# Patient Record
Sex: MALE | Race: WHITE | NOT HISPANIC OR LATINO | Employment: FULL TIME | URBAN - METROPOLITAN AREA
[De-identification: names, ages, dates, MRNs, and addresses within clinical notes are randomized per-mention and may not be internally consistent; named-entity substitution may affect disease eponyms.]

---

## 2019-08-30 ENCOUNTER — OFFICE VISIT (OUTPATIENT)
Dept: FAMILY MEDICINE CLINIC | Facility: CLINIC | Age: 29
End: 2019-08-30

## 2019-08-30 ENCOUNTER — LAB (OUTPATIENT)
Dept: LAB | Facility: HOSPITAL | Age: 29
End: 2019-08-30

## 2019-08-30 VITALS
HEART RATE: 79 BPM | BODY MASS INDEX: 25.15 KG/M2 | SYSTOLIC BLOOD PRESSURE: 122 MMHG | DIASTOLIC BLOOD PRESSURE: 80 MMHG | TEMPERATURE: 98.4 F | OXYGEN SATURATION: 100 % | HEIGHT: 71 IN | WEIGHT: 179.6 LBS

## 2019-08-30 DIAGNOSIS — R10.11 RIGHT UPPER QUADRANT ABDOMINAL PAIN: Primary | ICD-10-CM

## 2019-08-30 DIAGNOSIS — R10.11 RIGHT UPPER QUADRANT ABDOMINAL PAIN: ICD-10-CM

## 2019-08-30 DIAGNOSIS — R10.9 RIGHT FLANK PAIN: ICD-10-CM

## 2019-08-30 LAB
ALBUMIN SERPL-MCNC: 4.7 G/DL (ref 3.5–5.2)
ALBUMIN/GLOB SERPL: 1.7 G/DL
ALP SERPL-CCNC: 73 U/L (ref 39–117)
ALT SERPL W P-5'-P-CCNC: 18 U/L (ref 1–41)
ANION GAP SERPL CALCULATED.3IONS-SCNC: 10.4 MMOL/L (ref 5–15)
AST SERPL-CCNC: 20 U/L (ref 1–40)
BASOPHILS # BLD AUTO: 0.03 10*3/MM3 (ref 0–0.2)
BASOPHILS NFR BLD AUTO: 0.4 % (ref 0–1.5)
BILIRUB SERPL-MCNC: 0.4 MG/DL (ref 0.2–1.2)
BILIRUB UR QL STRIP: NEGATIVE
BUN BLD-MCNC: 10 MG/DL (ref 6–20)
BUN/CREAT SERPL: 11.9 (ref 7–25)
CALCIUM SPEC-SCNC: 10.4 MG/DL (ref 8.6–10.5)
CHLORIDE SERPL-SCNC: 98 MMOL/L (ref 98–107)
CLARITY UR: CLEAR
CO2 SERPL-SCNC: 28.6 MMOL/L (ref 22–29)
COLOR UR: YELLOW
CREAT BLD-MCNC: 0.84 MG/DL (ref 0.76–1.27)
DEPRECATED RDW RBC AUTO: 41.1 FL (ref 37–54)
EOSINOPHIL # BLD AUTO: 0.18 10*3/MM3 (ref 0–0.4)
EOSINOPHIL NFR BLD AUTO: 2.6 % (ref 0.3–6.2)
ERYTHROCYTE [DISTWIDTH] IN BLOOD BY AUTOMATED COUNT: 12.2 % (ref 12.3–15.4)
GFR SERPL CREATININE-BSD FRML MDRD: 108 ML/MIN/1.73
GLOBULIN UR ELPH-MCNC: 2.8 GM/DL
GLUCOSE BLD-MCNC: 66 MG/DL (ref 65–99)
GLUCOSE UR STRIP-MCNC: NEGATIVE MG/DL
HCT VFR BLD AUTO: 50.7 % (ref 37.5–51)
HGB BLD-MCNC: 16.2 G/DL (ref 13–17.7)
HGB UR QL STRIP.AUTO: NEGATIVE
IMM GRANULOCYTES # BLD AUTO: 0.04 10*3/MM3 (ref 0–0.05)
IMM GRANULOCYTES NFR BLD AUTO: 0.6 % (ref 0–0.5)
KETONES UR QL STRIP: NEGATIVE
LEUKOCYTE ESTERASE UR QL STRIP.AUTO: NEGATIVE
LYMPHOCYTES # BLD AUTO: 1.36 10*3/MM3 (ref 0.7–3.1)
LYMPHOCYTES NFR BLD AUTO: 19.8 % (ref 19.6–45.3)
MCH RBC QN AUTO: 29.2 PG (ref 26.6–33)
MCHC RBC AUTO-ENTMCNC: 32 G/DL (ref 31.5–35.7)
MCV RBC AUTO: 91.5 FL (ref 79–97)
MONOCYTES # BLD AUTO: 0.53 10*3/MM3 (ref 0.1–0.9)
MONOCYTES NFR BLD AUTO: 7.7 % (ref 5–12)
NEUTROPHILS # BLD AUTO: 4.72 10*3/MM3 (ref 1.7–7)
NEUTROPHILS NFR BLD AUTO: 68.9 % (ref 42.7–76)
NITRITE UR QL STRIP: NEGATIVE
NRBC BLD AUTO-RTO: 0 /100 WBC (ref 0–0.2)
PH UR STRIP.AUTO: 7 [PH] (ref 5–8)
PLATELET # BLD AUTO: 262 10*3/MM3 (ref 140–450)
PMV BLD AUTO: 11.7 FL (ref 6–12)
POTASSIUM BLD-SCNC: 4.8 MMOL/L (ref 3.5–5.2)
PROT SERPL-MCNC: 7.5 G/DL (ref 6–8.5)
PROT UR QL STRIP: NEGATIVE
RBC # BLD AUTO: 5.54 10*6/MM3 (ref 4.14–5.8)
SODIUM BLD-SCNC: 137 MMOL/L (ref 136–145)
SP GR UR STRIP: 1.01 (ref 1–1.03)
UROBILINOGEN UR QL STRIP: NORMAL
WBC NRBC COR # BLD: 6.86 10*3/MM3 (ref 3.4–10.8)

## 2019-08-30 PROCEDURE — 85025 COMPLETE CBC W/AUTO DIFF WBC: CPT

## 2019-08-30 PROCEDURE — 99203 OFFICE O/P NEW LOW 30 MIN: CPT | Performed by: PHYSICIAN ASSISTANT

## 2019-08-30 PROCEDURE — 36415 COLL VENOUS BLD VENIPUNCTURE: CPT

## 2019-08-30 PROCEDURE — 80053 COMPREHEN METABOLIC PANEL: CPT

## 2019-08-30 PROCEDURE — 81003 URINALYSIS AUTO W/O SCOPE: CPT | Performed by: PHYSICIAN ASSISTANT

## 2019-08-30 NOTE — PROGRESS NOTES
Subjective   Chaparro Villegas is a 28 y.o. male  Establish Care (New Patient establish Care) and Abdominal Pain (Upper Right Quadrant Pain and right rib pain x4 months )      History of Present Illness  Patient comes in today as a new patient to establish care in our practice.  He has been experiencing right upper quadrant abdominal pain and right flank pain for the past 4 months daily.  No particular pattern with eating.  No acid reflux, no nausea or vomiting, no breathing difficulties, bowels are normal for him he has been experiencing loose bowels for a number of years no change in pattern.  He experienced bright red rectal bleeding twice with bowel movements but typically no blood.  The following portions of the patient's history were reviewed and updated as appropriate: allergies, current medications, past social history and problem list    Review of Systems   Constitutional: Negative for appetite change, chills, fever and unexpected weight change.   Respiratory: Negative for cough, chest tightness and shortness of breath.    Cardiovascular: Negative for chest pain.   Gastrointestinal: Positive for abdominal pain and blood in stool. Negative for abdominal distention, constipation, diarrhea, nausea and vomiting.   Genitourinary: Negative.  Negative for difficulty urinating and dysuria.   Musculoskeletal: Negative for back pain.   Skin: Negative for color change and rash.   Allergic/Immunologic: Negative for food allergies.   Hematological: Bruises/bleeds easily.       Objective     Vitals:    08/30/19 0940   BP: 122/80   Pulse: 79   Temp: 98.4 °F (36.9 °C)   SpO2: 100%       Physical Exam   Constitutional: He is oriented to person, place, and time. He appears well-developed and well-nourished. No distress.   Eyes: Conjunctivae are normal.   Cardiovascular: Normal rate and regular rhythm.   Pulmonary/Chest: Effort normal and breath sounds normal.   Abdominal: Soft. Bowel sounds are normal. He exhibits no distension  and no mass. There is tenderness ( Mild to moderate tenderness right upper quadrant and right flank). There is no rebound and no guarding. No hernia.   Neurological: He is alert and oriented to person, place, and time.   Skin: Skin is warm and dry. No rash noted. He is not diaphoretic. No erythema. No pallor.   Psychiatric: He has a normal mood and affect. His behavior is normal. Judgment and thought content normal.   Nursing note and vitals reviewed.      Assessment/Plan     Diagnoses and all orders for this visit:    Right upper quadrant abdominal pain  -     CBC & Differential; Future  -     Comprehensive metabolic panel; Future  -     US Liver; Future  -     US Gallbladder; Future  -     US Renal Limited; Future    Right flank pain  -     US Renal Limited; Future    Other orders  -     CBD oil (cannabidiol) capsule; Take 60 capsules by mouth Every Morning. For anxiety    I will contact patient with lab and ultrasound results when I receive them and review them and determine further treatment plan at that time.

## 2019-09-10 ENCOUNTER — APPOINTMENT (OUTPATIENT)
Dept: ULTRASOUND IMAGING | Facility: HOSPITAL | Age: 29
End: 2019-09-10

## 2019-09-10 ENCOUNTER — HOSPITAL ENCOUNTER (OUTPATIENT)
Dept: ULTRASOUND IMAGING | Facility: HOSPITAL | Age: 29
Discharge: HOME | End: 2019-09-10
Admitting: PHYSICIAN ASSISTANT

## 2019-09-10 DIAGNOSIS — R10.11 RIGHT UPPER QUADRANT ABDOMINAL PAIN: ICD-10-CM

## 2019-09-10 PROCEDURE — 76705 ECHO EXAM OF ABDOMEN: CPT

## 2019-09-11 ENCOUNTER — APPOINTMENT (OUTPATIENT)
Dept: ULTRASOUND IMAGING | Facility: HOSPITAL | Age: 29
End: 2019-09-11

## 2019-09-23 ENCOUNTER — TELEPHONE (OUTPATIENT)
Dept: FAMILY MEDICINE CLINIC | Facility: CLINIC | Age: 29
End: 2019-09-23

## 2019-09-24 ENCOUNTER — TRANSCRIBE ORDERS (OUTPATIENT)
Dept: FAMILY MEDICINE CLINIC | Facility: CLINIC | Age: 29
End: 2019-09-24

## 2019-09-24 DIAGNOSIS — R16.0 LIVER MASS: Primary | ICD-10-CM

## 2019-10-09 ENCOUNTER — TELEPHONE (OUTPATIENT)
Dept: FAMILY MEDICINE CLINIC | Facility: CLINIC | Age: 29
End: 2019-10-09

## 2019-10-15 ENCOUNTER — TRANSCRIBE ORDERS (OUTPATIENT)
Dept: FAMILY MEDICINE CLINIC | Facility: CLINIC | Age: 29
End: 2019-10-15

## 2019-10-15 ENCOUNTER — TELEPHONE (OUTPATIENT)
Dept: FAMILY MEDICINE CLINIC | Facility: CLINIC | Age: 29
End: 2019-10-15

## 2019-10-15 DIAGNOSIS — R10.31 RIGHT LOWER QUADRANT ABDOMINAL PAIN: Primary | ICD-10-CM

## 2019-10-15 NOTE — TELEPHONE ENCOUNTER
Sandy, when I look at this it shows that I signed it on the date of 26 September.  He will not let me sign it now.  I do not know why it is not going through.  It seems like I got this message several times and I have signed it.

## 2019-10-23 ENCOUNTER — HOSPITAL ENCOUNTER (OUTPATIENT)
Dept: CT IMAGING | Facility: HOSPITAL | Age: 29
Discharge: HOME | End: 2019-10-23
Admitting: PHYSICIAN ASSISTANT

## 2019-10-23 PROCEDURE — 74178 CT ABD&PLV WO CNTR FLWD CNTR: CPT

## 2019-10-23 PROCEDURE — 25010000002 IOPAMIDOL 61 % SOLUTION: Performed by: PHYSICIAN ASSISTANT

## 2019-10-23 RX ADMIN — BARIUM SULFATE 450 ML: 21 SUSPENSION ORAL at 16:32

## 2019-10-23 RX ADMIN — IOPAMIDOL 95 ML: 612 INJECTION, SOLUTION INTRAVENOUS at 16:30

## 2019-10-28 ENCOUNTER — TRANSCRIBE ORDERS (OUTPATIENT)
Dept: FAMILY MEDICINE CLINIC | Facility: CLINIC | Age: 29
End: 2019-10-28

## 2019-10-28 DIAGNOSIS — R10.31 RIGHT LOWER QUADRANT ABDOMINAL PAIN: Primary | ICD-10-CM

## 2020-02-13 ENCOUNTER — TELEPHONE (OUTPATIENT)
Dept: FAMILY MEDICINE CLINIC | Facility: CLINIC | Age: 30
End: 2020-02-13

## 2020-02-13 NOTE — TELEPHONE ENCOUNTER
PT CALLED STATING HE IS READY, INCOME WISE TO PAY FOR THE APPT W/ GASTROENTEROLOGY. PLEASE GIVE PT A CALL TO ADVISE.

## 2020-02-13 NOTE — TELEPHONE ENCOUNTER
I am not sure if we need to place referral or exactly what is going on with this, Sandy do you know?

## 2020-02-14 ENCOUNTER — TRANSCRIBE ORDERS (OUTPATIENT)
Dept: FAMILY MEDICINE CLINIC | Facility: CLINIC | Age: 30
End: 2020-02-14

## 2020-02-14 DIAGNOSIS — R10.31 RIGHT LOWER QUADRANT ABDOMINAL PAIN: Primary | ICD-10-CM

## 2020-03-11 ENCOUNTER — OFFICE VISIT (OUTPATIENT)
Dept: GASTROENTEROLOGY | Facility: CLINIC | Age: 30
End: 2020-03-11

## 2020-03-11 ENCOUNTER — LAB (OUTPATIENT)
Dept: LAB | Facility: HOSPITAL | Age: 30
End: 2020-03-11

## 2020-03-11 VITALS
RESPIRATION RATE: 18 BRPM | HEIGHT: 71 IN | TEMPERATURE: 99.1 F | HEART RATE: 92 BPM | DIASTOLIC BLOOD PRESSURE: 78 MMHG | BODY MASS INDEX: 24.64 KG/M2 | OXYGEN SATURATION: 99 % | WEIGHT: 176 LBS | SYSTOLIC BLOOD PRESSURE: 118 MMHG

## 2020-03-11 DIAGNOSIS — R10.9 CHRONIC ABDOMINAL PAIN: ICD-10-CM

## 2020-03-11 DIAGNOSIS — G89.29 CHRONIC ABDOMINAL PAIN: ICD-10-CM

## 2020-03-11 DIAGNOSIS — R93.89 ABNORMAL FINDING ON RADIOLOGY EXAM: ICD-10-CM

## 2020-03-11 DIAGNOSIS — R93.89 ABNORMAL FINDING ON RADIOLOGY EXAM: Primary | ICD-10-CM

## 2020-03-11 LAB
ALBUMIN SERPL-MCNC: 4.5 G/DL (ref 3.5–5.2)
ALBUMIN/GLOB SERPL: 1.9 G/DL
ALP SERPL-CCNC: 64 U/L (ref 39–117)
ALT SERPL W P-5'-P-CCNC: 31 U/L (ref 1–41)
ANION GAP SERPL CALCULATED.3IONS-SCNC: 14.7 MMOL/L (ref 5–15)
AST SERPL-CCNC: 23 U/L (ref 1–40)
BILIRUB SERPL-MCNC: 0.3 MG/DL (ref 0.2–1.2)
BUN BLD-MCNC: 13 MG/DL (ref 6–20)
BUN/CREAT SERPL: 13 (ref 7–25)
CALCIUM SPEC-SCNC: 9.4 MG/DL (ref 8.6–10.5)
CEA SERPL-MCNC: 2.48 NG/ML
CHLORIDE SERPL-SCNC: 100 MMOL/L (ref 98–107)
CO2 SERPL-SCNC: 29.3 MMOL/L (ref 22–29)
CREAT BLD-MCNC: 1 MG/DL (ref 0.76–1.27)
GFR SERPL CREATININE-BSD FRML MDRD: 88 ML/MIN/1.73
GLOBULIN UR ELPH-MCNC: 2.4 GM/DL
GLUCOSE BLD-MCNC: 85 MG/DL (ref 65–99)
HBV CORE IGM SERPL QL IA: NORMAL
HBV SURFACE AB SER RIA-ACNC: REACTIVE
HBV SURFACE AG SERPL QL IA: NORMAL
HCV AB SER DONR QL: NORMAL
POTASSIUM BLD-SCNC: 4 MMOL/L (ref 3.5–5.2)
PROT SERPL-MCNC: 6.9 G/DL (ref 6–8.5)
SODIUM BLD-SCNC: 144 MMOL/L (ref 136–145)

## 2020-03-11 PROCEDURE — 99204 OFFICE O/P NEW MOD 45 MIN: CPT | Performed by: INTERNAL MEDICINE

## 2020-03-11 PROCEDURE — 80053 COMPREHEN METABOLIC PANEL: CPT

## 2020-03-11 PROCEDURE — 36415 COLL VENOUS BLD VENIPUNCTURE: CPT | Performed by: INTERNAL MEDICINE

## 2020-03-11 PROCEDURE — 82378 CARCINOEMBRYONIC ANTIGEN: CPT | Performed by: INTERNAL MEDICINE

## 2020-03-11 PROCEDURE — 86705 HEP B CORE ANTIBODY IGM: CPT

## 2020-03-11 PROCEDURE — 86803 HEPATITIS C AB TEST: CPT

## 2020-03-11 PROCEDURE — 86704 HEP B CORE ANTIBODY TOTAL: CPT

## 2020-03-11 PROCEDURE — 87340 HEPATITIS B SURFACE AG IA: CPT

## 2020-03-11 PROCEDURE — 86706 HEP B SURFACE ANTIBODY: CPT

## 2020-03-12 LAB — HOLD SPECIMEN: NORMAL

## 2020-03-13 LAB — HBV CORE AB SER DONR QL IA: NEGATIVE

## 2020-05-08 ENCOUNTER — HOSPITAL ENCOUNTER (OUTPATIENT)
Dept: MRI IMAGING | Facility: HOSPITAL | Age: 30
Discharge: HOME | End: 2020-05-08
Admitting: INTERNAL MEDICINE

## 2020-05-08 DIAGNOSIS — R10.9 CHRONIC ABDOMINAL PAIN: ICD-10-CM

## 2020-05-08 DIAGNOSIS — G89.29 CHRONIC ABDOMINAL PAIN: ICD-10-CM

## 2020-05-08 DIAGNOSIS — R93.89 ABNORMAL FINDING ON RADIOLOGY EXAM: ICD-10-CM

## 2020-05-08 PROCEDURE — 0 GADOBENATE DIMEGLUMINE 529 MG/ML SOLUTION: Performed by: INTERNAL MEDICINE

## 2020-05-08 PROCEDURE — 74183 MRI ABD W/O CNTR FLWD CNTR: CPT

## 2020-05-08 PROCEDURE — A9577 INJ MULTIHANCE: HCPCS | Performed by: INTERNAL MEDICINE

## 2020-05-08 RX ADMIN — GADOBENATE DIMEGLUMINE 17 ML: 529 INJECTION, SOLUTION INTRAVENOUS at 15:56

## 2021-11-17 ENCOUNTER — NURSE TRIAGE (OUTPATIENT)
Dept: CALL CENTER | Facility: HOSPITAL | Age: 31
End: 2021-11-17

## 2021-11-17 NOTE — TELEPHONE ENCOUNTER
Experiencing vertigo has had vertigo for years. But this has increased.  Warm transfer to MD office, spoke with Victoria.    Reason for Disposition  • [1] MODERATE dizziness (e.g., vertigo; feels very unsteady, interferes with normal activities) AND [2] has NOT been evaluated by physician for this    Additional Information  • Negative: [1] Weakness (i.e., paralysis, loss of muscle strength) of the face, arm or leg on one side of the body AND [2] sudden onset AND [3] present now  • Negative: [1] Numbness (i.e., loss of sensation) of the face, arm or leg on one side of the body AND [2] sudden onset AND [3] present now  • Negative: [1] Loss of speech or garbled speech AND [2] sudden onset AND [3] present now  • Negative: Difficult to awaken or acting confused (e.g., disoriented, slurred speech)  • Negative: Sounds like a life-threatening emergency to the triager  • Negative: Followed a head injury  • Negative: Followed an ear injury  • Negative: Localized weakness or numbness is main symptom  • Negative: Dizziness relates to riding in a car, going to an amusement park, etc.  • Negative: [1] Dizziness is main symptom AND [2] NO spinning sensation (i.e., vertigo)  • Negative: SEVERE dizziness (vertigo) (e.g., unable to walk without assistance)  • Negative: Severe headache (e.g., excruciating)  (Exception: similar to previous migraines)  • Negative: [1] Dizziness (vertigo) present now AND [2] one or more STROKE RISK FACTORS (i.e., hypertension, diabetes, prior stroke/TIA, heart attack)  (Exception: prior physician evaluation for this AND no different/worse than usual)  • Negative: [1] Dizziness (vertigo) present now AND [2] age > 59  (Exception: prior physician evaluation for this AND no different/worse than usual)  • Negative: [1] Weakness (i.e., paralysis, loss of muscle strength) of the face, arm / hand, or leg / foot on one side of the body AND [2] sudden onset AND [3] brief (now gone)  • Negative: [1] Numbness (i.e.,  "loss of sensation) of the face, arm / hand, or leg / foot on one side of the body AND [2] sudden onset AND [3] brief (now gone)  • Negative: [1] Loss of speech or garbled speech AND [2] sudden onset AND [3] brief (now gone)  • Negative: Loss of vision or double vision (Exception: similar to previous migraines)  • Negative: Patient sounds very sick or weak to the triager  • Negative: Taking a medicine that could cause dizziness (e.g., phenytoin [Dilantin], carbamazepine [Tegretol], primidone [Mysoline])    Answer Assessment - Initial Assessment Questions  1. DESCRIPTION: \"Describe your dizziness.\"  Room spinnnning  2. VERTIGO: \"Do you feel like either you or the room is spinning or tilting?\"       When eyes close it spinnning  3. LIGHTHEADED: \"Do you feel lightheaded?\" (e.g., somewhat faint, woozy, weak upon standing)  Not necessarily  4. SEVERITY: \"How bad is it?\"  \"Can you walk?\"    - MILD: Feels unsteady but walking normally.    - MODERATE: Feels very unsteady when walking, but not falling; interferes with normal activities (e.g., school, work) .    - SEVERE: Unable to walk without falling, or requires assistance to walk without falling.      *No Answer*  5. ONSET:  \"When did the dizziness begin?\"   early Monday morning  6. AGGRAVATING FACTORS: \"Does anything make it worse?\" (e.g., standing, change in head position)    Change in head position, head tilt  7. CAUSE: \"What do you think is causing the dizziness?\"      *No Answer*  8. RECURRENT SYMPTOM: \"Have you had dizziness before?\" If Yes, ask: \"When was the last time?\" \"What happened that time?\"  yes  9. OTHER SYMPTOMS: \"Do you have any other symptoms?\" (e.g., headache, weakness, numbness, vomiting, earache)  Tiny bit of headache, no vomiting  10. PREGNANCY: \"Is there any chance you are pregnant?\" \"When was your last menstrual period?\"  na    Protocols used: DIZZINESS - VERTIGO-ADULT-AH      "

## 2021-12-10 DIAGNOSIS — J30.2 SEASONAL ALLERGIES: ICD-10-CM

## 2021-12-10 RX ORDER — FLUTICASONE PROPIONATE 50 MCG
2 SPRAY, SUSPENSION (ML) NASAL NIGHTLY
Qty: 18.2 ML | Refills: 0 | OUTPATIENT
Start: 2021-12-10 | End: 2022-01-09

## 2021-12-10 NOTE — TELEPHONE ENCOUNTER
Caller: Chaparro Villegas    Relationship: Self    Best call back number: [phone]    Requested Prescriptions:   Requested Prescriptions     Pending Prescriptions Disp Refills   • fluticasone (FLONASE) 50 MCG/ACT nasal spray 18.2 mL 0     Si sprays into the nostril(s) as directed by provider Every Night for 30 days. Administer 2 sprays in each nostril for each dose.        Pharmacy where request should be sent: [facility]/PHARMACY #[id number] [address] [phone] [facility] [phone]      Additional details provided by patient: PATIENT STATED HE WAS PRESCRIBED THIS BY THE [facility] AND IT HAS HELPED HIM QUITE A LOT. PATIENT WOULD LIKE TO KNOW IF RUTHANN GARCIA WILL CONTINUE TO PRESCRIBE THIS TO HIM.    Does the patient have less than a 3 day supply:  [] Yes  [x] No    Madelyn Horne Rep   12/10/21 09:25 EST

## 2021-12-28 ENCOUNTER — OFFICE VISIT (OUTPATIENT)
Dept: FAMILY MEDICINE CLINIC | Facility: CLINIC | Age: 31
End: 2021-12-28

## 2021-12-28 VITALS
OXYGEN SATURATION: 97 % | HEIGHT: 72 IN | RESPIRATION RATE: 14 BRPM | DIASTOLIC BLOOD PRESSURE: 86 MMHG | WEIGHT: 186 LBS | HEART RATE: 112 BPM | BODY MASS INDEX: 25.19 KG/M2 | TEMPERATURE: 97.3 F | SYSTOLIC BLOOD PRESSURE: 122 MMHG

## 2021-12-28 DIAGNOSIS — G44.229 CHRONIC TENSION-TYPE HEADACHE, NOT INTRACTABLE: ICD-10-CM

## 2021-12-28 DIAGNOSIS — J30.2 SEASONAL ALLERGIC RHINITIS, UNSPECIFIED TRIGGER: ICD-10-CM

## 2021-12-28 DIAGNOSIS — H81.10 BENIGN PAROXYSMAL POSITIONAL VERTIGO, UNSPECIFIED LATERALITY: Primary | ICD-10-CM

## 2021-12-28 PROCEDURE — 99213 OFFICE O/P EST LOW 20 MIN: CPT | Performed by: PHYSICIAN ASSISTANT

## 2021-12-28 NOTE — PROGRESS NOTES
"Subjective   Chaparrokam Villegas is a 31 y.o. male  Dizziness (medication f/u)    The patient consents to recording with BEATRIS.    History of Present Illness    Mr. Villegas comes in for evaluation of dizziness. He was recently seen at the urgent care for a severe episodes of dizziness and a sensation of near-syncope. During the urgent care visit, he had a blood pressure of 137/85 mmHg. He was given meclizine, antihistamine, and steroid nasal sprays at that visit.     Today, the patient reports a low grade dizziness and odd experiences with motion, such as when parking a car. His symptoms are not aggravated by exiting bed. He endorses lightheadedness, \"head rush\", and \"static\"-like vision changes when transitioning from a crouching to standing position. He confirms doing the Epley maneuver at home with the assistance of his girlfriend. He has reduced the dose of hydroxyzine to once per day due to poor tolerance.    He relates experiencing an intermittent vertigo related to up and down movements of the head and tracking with the eyes that started 5 years ago. 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 He describes a trailing of perception when ambulating, and this balances itself out after he focuses his vision on one spot. He denies any double vision, loss of vision, or absence of a portion of the visual field. He tries to change postures for relief. The patient states it has been over 1 year since his last eye exam.    He describes frequent neck-based tension headaches that occur 1 to 3 times per week. This has been ongoing for greater than 1 year. For this he is taking ibuprofen 600 mg, which alleviated his headache within a few hours. He tolerates the ibuprofen well, but he admits to taking ibuprofen with only coffee in the morning. He has not seen a neurologist for these tension headaches. Of note, he reports experiencing cluster headaches for 3 years between the ages of 9 and 12 years old. He had seen a " neurologist at that time. Those stopped occurring when he started marijuana.    He relates over the past few years he has developed a slight wheeze. The patient relates having a history of sinus issues that he initially attributed to a past history of smoking. He reports a history of smoking and vaping. He started smoking cigarettes from ages 15 to 21, began vaping from then until the start of the COVID-19 pandemic, then picked up smoking cigarettes again during the pandemic for 1 year. He is currently using nicotine patches. He does smoke marijuana without tobacco roll, and he has decreased use.    Regarding medical history, he reports having severe allergies during childhood.    The following portions of the patient's history were reviewed and updated as appropriate: allergies, current medications, past social history and problem list.    Review of Systems   Eyes: Positive for visual disturbance (visual tracking problem).   Respiratory: Positive for wheezing.    Neurological: Positive for dizziness, light-headedness and headaches.       Objective     Vitals:    12/28/21 1432   BP: 122/86   Pulse: 112   Resp: 14   Temp: 97.3 °F (36.3 °C)   SpO2: 97%       Physical Exam  Vitals and nursing note reviewed.   Constitutional:       General: He is not in acute distress.     Appearance: Normal appearance. He is well-developed. He is not ill-appearing, toxic-appearing or diaphoretic.   HENT:      Head: Normocephalic and atraumatic.      Right Ear: Ear canal normal. No middle ear effusion.      Left Ear: Ear canal normal.  No middle ear effusion.   Eyes:      Extraocular Movements: Extraocular movements intact.      Conjunctiva/sclera: Conjunctivae normal.      Pupils: Pupils are equal, round, and reactive to light.   Neck:      Vascular: No carotid bruit.   Cardiovascular:      Rate and Rhythm: Normal rate and regular rhythm.      Heart sounds: Normal heart sounds.   Pulmonary:      Effort: Pulmonary effort is normal.       Breath sounds: Normal breath sounds. No wheezing, rhonchi or rales.   Musculoskeletal:      Cervical back: Normal range of motion and neck supple. No rigidity.   Neurological:      General: No focal deficit present.      Mental Status: He is alert and oriented to person, place, and time.      Cranial Nerves: No cranial nerve deficit.      Sensory: No sensory deficit.      Motor: No weakness.      Coordination: Coordination normal.      Gait: Gait normal.   Psychiatric:         Mood and Affect: Mood normal.         Speech: Speech normal.         Behavior: Behavior normal.         Thought Content: Thought content normal.         Judgment: Judgment normal.         Assessment/Plan     Diagnoses and all orders for this visit:    1. Benign paroxysmal positional vertigo, unspecified laterality (Primary)    2. Chronic tension-type headache, not intractable  -     Ambulatory Referral to Neurology    3. Seasonal allergic rhinitis, unspecified trigger      We will refer the patient to a neurologist for evaluation of his chronic tension headaches and ongoing visual tracking issue. I think at this point his episode of dizziness that he had at the urgent care has improved drastically. It sounds more like a benign positional vertigo. It was most likely due to some inner ear allergy, barometric pressure, or eustachian tube issue. The treatment usually is meclizine, antihistamine, and steroid nasal sprays which he was given at the urgent care.  He is recommended to take half a tablet of meclizine once per day for dizziness. He is advised to stay on the Flonase nasal spray and Claritin D for another month to help dry out the eustachian tubes and to help prevent a recurrence of a vertigo episode.    Answers for HPI/ROS submitted by the patient on 12/26/2021  What is the primary reason for your visit?: Other  Please describe your symptoms.: Follow up after short, intense bouts of vertigo. Light neck and eye movement based vertigo has  been experienced for 5-6 years, but recent occurrences were much more intense (15-20 second total loss of spatial awareness while sitting upright). Two instances occurred within three days under similar circumstances. Urgent care visit was performed after recommendation from ' office. Physician identified inflamed sinuses and increased stress as potential causes. No additional intense instances have occurred, but light bouts caused by neck movement/eye tracking have continued., , No checkups have occurred since last visit with  pre-pandemic. Overall wellness check as part of this visit would be greatly appreciated.  Have you had these symptoms before?: No  How long have you been having these symptoms?: 1-4 days  Please list any medications you are currently taking for this condition.: Fluticasone, meclizine and hydroxyzine were prescribed by urgent care. Use of fluticasone has been maintained. Meclizine and hydroxyzine use was discontinued due to uncomfortable mental side effects (grogginess, feelings of light-headedness and spacy feelings similar to light intoxication) and potential cardiac side effects (feelings of skipped/arrhythmic heartbeats when exposed to stressful stimuli). , , Currently using:, Fluticasone, Loratadine w/ psuedoephedrine  Please describe any probable cause for these symptoms. : Sinus inflammation, Significant increase in professional stress    Transcribed from ambient dictation for Chio Franco PA-C by Celeste Lynn.  12/28/21   18:37 EST    Patient verbalized consent to the visit recording.

## 2022-03-18 ENCOUNTER — OFFICE VISIT (OUTPATIENT)
Dept: NEUROLOGY | Facility: CLINIC | Age: 32
End: 2022-03-18

## 2022-03-18 ENCOUNTER — LAB (OUTPATIENT)
Dept: LAB | Facility: HOSPITAL | Age: 32
End: 2022-03-18

## 2022-03-18 VITALS
HEART RATE: 113 BPM | OXYGEN SATURATION: 97 % | BODY MASS INDEX: 24.52 KG/M2 | WEIGHT: 181 LBS | DIASTOLIC BLOOD PRESSURE: 72 MMHG | HEIGHT: 72 IN | SYSTOLIC BLOOD PRESSURE: 124 MMHG

## 2022-03-18 DIAGNOSIS — R42 VERTIGO: ICD-10-CM

## 2022-03-18 DIAGNOSIS — R42 DIZZINESS: ICD-10-CM

## 2022-03-18 DIAGNOSIS — R00.2 PALPITATIONS: ICD-10-CM

## 2022-03-18 DIAGNOSIS — R51.9 CHRONIC NONINTRACTABLE HEADACHE, UNSPECIFIED HEADACHE TYPE: ICD-10-CM

## 2022-03-18 DIAGNOSIS — R42 DIZZINESS: Primary | ICD-10-CM

## 2022-03-18 DIAGNOSIS — G89.29 CHRONIC NONINTRACTABLE HEADACHE, UNSPECIFIED HEADACHE TYPE: ICD-10-CM

## 2022-03-18 LAB
ALBUMIN SERPL-MCNC: 5.1 G/DL (ref 3.5–5.2)
ALBUMIN/GLOB SERPL: 2.1 G/DL
ALP SERPL-CCNC: 79 U/L (ref 39–117)
ALT SERPL W P-5'-P-CCNC: 17 U/L (ref 1–41)
ANION GAP SERPL CALCULATED.3IONS-SCNC: 11.7 MMOL/L (ref 5–15)
AST SERPL-CCNC: 19 U/L (ref 1–40)
BILIRUB SERPL-MCNC: 0.4 MG/DL (ref 0–1.2)
BUN SERPL-MCNC: 9 MG/DL (ref 6–20)
BUN/CREAT SERPL: 8.8 (ref 7–25)
CALCIUM SPEC-SCNC: 10 MG/DL (ref 8.6–10.5)
CHLORIDE SERPL-SCNC: 103 MMOL/L (ref 98–107)
CO2 SERPL-SCNC: 28.3 MMOL/L (ref 22–29)
CREAT SERPL-MCNC: 1.02 MG/DL (ref 0.76–1.27)
DEPRECATED RDW RBC AUTO: 38.3 FL (ref 37–54)
EGFRCR SERPLBLD CKD-EPI 2021: 100.1 ML/MIN/1.73
ERYTHROCYTE [DISTWIDTH] IN BLOOD BY AUTOMATED COUNT: 12 % (ref 12.3–15.4)
GLOBULIN UR ELPH-MCNC: 2.4 GM/DL
GLUCOSE SERPL-MCNC: 91 MG/DL (ref 65–99)
HCT VFR BLD AUTO: 47.2 % (ref 37.5–51)
HGB BLD-MCNC: 16.2 G/DL (ref 13–17.7)
MCH RBC QN AUTO: 30.2 PG (ref 26.6–33)
MCHC RBC AUTO-ENTMCNC: 34.3 G/DL (ref 31.5–35.7)
MCV RBC AUTO: 87.9 FL (ref 79–97)
PLATELET # BLD AUTO: 273 10*3/MM3 (ref 140–450)
PMV BLD AUTO: 10.6 FL (ref 6–12)
POTASSIUM SERPL-SCNC: 4.5 MMOL/L (ref 3.5–5.2)
PROT SERPL-MCNC: 7.5 G/DL (ref 6–8.5)
QT INTERVAL: 336 MS
QTC INTERVAL: 404 MS
RBC # BLD AUTO: 5.37 10*6/MM3 (ref 4.14–5.8)
SODIUM SERPL-SCNC: 143 MMOL/L (ref 136–145)
TSH SERPL DL<=0.05 MIU/L-ACNC: 0.81 UIU/ML (ref 0.27–4.2)
WBC NRBC COR # BLD: 7.11 10*3/MM3 (ref 3.4–10.8)

## 2022-03-18 PROCEDURE — 93005 ELECTROCARDIOGRAM TRACING: CPT | Performed by: NURSE PRACTITIONER

## 2022-03-18 PROCEDURE — 99214 OFFICE O/P EST MOD 30 MIN: CPT | Performed by: NURSE PRACTITIONER

## 2022-03-18 PROCEDURE — 93010 ELECTROCARDIOGRAM REPORT: CPT | Performed by: INTERNAL MEDICINE

## 2022-03-18 PROCEDURE — 82746 ASSAY OF FOLIC ACID SERUM: CPT

## 2022-03-18 PROCEDURE — 36415 COLL VENOUS BLD VENIPUNCTURE: CPT

## 2022-03-18 PROCEDURE — 82607 VITAMIN B-12: CPT

## 2022-03-18 PROCEDURE — 80050 GENERAL HEALTH PANEL: CPT

## 2022-03-18 NOTE — PROGRESS NOTES
"Subjective:     Patient ID: Chaparro Villegas is a 31 y.o. male.    CC:   Chief Complaint   Patient presents with   • Headache     Tension   • Dizziness     Vertigo           HPI:   History of Present Illness     Mr. Villegas has a very pleasant 31-year-old male here today with complaints of vertigo and dizziness.  He has been experiencing this for at least 5 years now.  He says that he notices problems with vertigo and dizziness mostly with neck movements, when he looks up or down he can usually elicit some type of dizziness.  He describes both the sensation as if the room is spinning as well as lightheadedness intermittently.  He experiences \"decreased latency with eye movements\".  In November 2020 when he had 2 episodes very close together where he he had what he described as less spatial awareness than normal for him, he had a spinning sensation lasting 20 to 30 seconds, this occurred while he was seated at work, he was not overly stressed at the moment.  He has had no significant episodes since that time.  After the spell he did go to the [facility], he was given a prescription for both hydroxyzine and meclizine and was treated for sinus issues with allergy medications.  He has occasional headaches, he was diagnosed with cluster headache when he was 11 or 12 years old, cluster headaches stopped when he started smoking marijuana back in 2015.  When he has headaches now he typically wakes up with them in the morning, he will take 3 ibuprofen which tend to help, this is occurring about 3-4 times a week.   Complains of a dull ache holocranial with mild nausea, no photo or phonophobia.  His headaches are rated as mild, 4 out of 10 on pain scale.  He denies double vision or vision change, he is up-to-date on eye exam.  He denies any confusional episodes, slurred speech, paresthesias, syncope, fainting or falls.  He admits that he smokes marijuana regularly and also uses hallucinogenic mushrooms by \"microdosing\".    He also " complains of frequent heart palpitations, he can relate his dizziness to palpitations at times  The following portions of the patient's history were reviewed and updated as appropriate: allergies, current medications, past family history, past medical history, past social history, past surgical history and problem list.    Past Medical History:   Diagnosis Date   • Cluster headache [id number]    Regular occurence (at least weekly) roughly 2657-7490; cause never determined   • Headache    • Headache, tension-type [id number]    Semi-regular occurence through adulthood   • Hypertension [id number]    Doctors have report slight elevation from normal levels       History reviewed. No pertinent surgical history.    Social History     Socioeconomic History   • Marital status: Single   Tobacco Use   • Smoking status: Current Every Day Smoker     Packs/day: 0.25     Years: 5.00     Pack years: 1.25     Types: Electronic Cigarette     Start date: 2006     Last attempt to quit: 2011     Years since quittin.2   • Smokeless tobacco: Never Used   • Tobacco comment: Off and on for 15 years between cigarettes, e-cigarettes and oral delivery   Vaping Use   • Vaping Use: Every day   • Devices: Disposable   Substance and Sexual Activity   • Alcohol use: No   • Drug use: Not Currently   • Sexual activity: Yes     Partners: Female     Birth control/protection: None     Comment: Monogamous - 9 years       Family History   Problem Relation Age of Onset   • Arthritis Mother    • Colon polyps Mother    • Migraines Mother    • Colon polyps Father    • Hypertension Father    • Learning disabilities Brother    • Mental illness Brother    • Stroke Maternal Aunt    • Mental illness Paternal Uncle    • Arthritis Maternal Grandmother    • Migraines Maternal Grandmother    • Osteoporosis Maternal Grandmother    • Breast cancer Paternal Grandmother    • Diabetes Paternal Grandmother    • Mental illness Paternal Grandmother    • Stroke  "Paternal Grandfather    • Heart attack Paternal Grandfather         Review of Systems   Constitutional: Negative for chills, fatigue, fever and unexpected weight change.   HENT: Negative for ear pain, hearing loss, nosebleeds, rhinorrhea and sore throat.    Eyes: Negative for photophobia, pain, discharge, itching and visual disturbance.   Respiratory: Negative for cough, chest tightness, shortness of breath and wheezing.    Cardiovascular: Negative for chest pain, palpitations and leg swelling.   Gastrointestinal: Negative for abdominal pain, blood in stool, constipation, diarrhea, nausea and vomiting.   Genitourinary: Negative for dysuria, frequency, hematuria and urgency.   Musculoskeletal: Negative for arthralgias, back pain, gait problem, joint swelling, myalgias, neck pain and neck stiffness.   Skin: Negative for rash and wound.   Allergic/Immunologic: Negative for environmental allergies and food allergies.   Neurological: Positive for light-headedness and headaches. Negative for dizziness, tremors, seizures, syncope, facial asymmetry, speech difficulty, weakness and numbness.   Hematological: Negative for adenopathy. Does not bruise/bleed easily.   Psychiatric/Behavioral: Negative for agitation, confusion, decreased concentration, hallucinations, sleep disturbance and suicidal ideas. The patient is not nervous/anxious.    All other systems reviewed and are negative.       Objective:  /72   Pulse 113   Ht 182.9 cm (72\")   Wt 82.1 kg (181 lb)   SpO2 97%   BMI 24.55 kg/m²     Neurologic Exam     Mental Status   Oriented to person, place, and time.   Attention: normal. Concentration: normal.   Speech: speech is normal   Level of consciousness: alert  Knowledge: consistent with education.   Able to read. Able to write. Normal comprehension.     Cranial Nerves   Cranial nerves II through XII intact.     CN II   Visual fields full to confrontation.   Right visual field deficit: none  Left visual field " deficit: none     CN III, IV, VI   Pupils are equal, round, and reactive to light.  Extraocular motions are normal.   Right pupil: Size: 3 mm. Shape: regular. Reactivity: brisk. Consensual response: intact. Accommodation: intact.   Left pupil: Size: 3 mm. Shape: regular. Reactivity: brisk. Consensual response: intact. Accommodation: intact.   CN III: no CN III palsy  CN VI: no CN VI palsy  Nystagmus: none   Upgaze: normal  Downgaze: normal    CN V   Facial sensation intact.     CN VII   Facial expression full, symmetric.     CN VIII   CN VIII normal.     CN IX, X   CN IX normal.   CN X normal.     CN XI   CN XI normal.     CN XII   CN XII normal.     Motor Exam   Muscle bulk: normal  Overall muscle tone: normal  Right arm tone: normal  Left arm tone: normal  Right arm pronator drift: absent  Left arm pronator drift: absent  Right leg tone: normal  Left leg tone: normal    Strength   Strength 5/5 throughout.     Sensory Exam   Light touch normal.     Gait, Coordination, and Reflexes     Gait  Gait: normal    Coordination   Romberg: negative  Finger to nose coordination: normal  Heel to shin coordination: normal  Tandem walking coordination: normal    Tremor   Resting tremor: absent  Intention tremor: absent  Action tremor: absent    Reflexes   Reflexes 2+ except as noted.   Right plantar: normal  Left plantar: normal  Right Ace: absent  Left Ace: absent      Physical Exam  Vitals reviewed.   Constitutional:       Appearance: He is well-developed.   HENT:      Head: Normocephalic and atraumatic.   Eyes:      General: No scleral icterus.     Extraocular Movements: Extraocular movements intact and EOM normal.      Conjunctiva/sclera: Conjunctivae normal.      Pupils: Pupils are equal, round, and reactive to light.   Pulmonary:      Effort: Pulmonary effort is normal. No respiratory distress.   Musculoskeletal:      Cervical back: Normal range of motion and neck supple.   Skin:     General: Skin is warm.       Capillary Refill: Capillary refill takes less than 2 seconds.   Neurological:      General: No focal deficit present.      Mental Status: He is alert and oriented to person, place, and time.      Coordination: Finger-Nose-Finger Test, Heel to Shin Test and Romberg Test normal.      Gait: Gait is intact. Tandem walk normal.      Deep Tendon Reflexes: Strength normal.   Psychiatric:         Mood and Affect: Mood normal.         Speech: Speech normal.         Behavior: Behavior normal.         Thought Content: Thought content normal.         Judgment: Judgment normal.         Assessment/Plan:       Diagnoses and all orders for this visit:    1. Dizziness (Primary)  -     CBC (No Diff); Future  -     Comprehensive Metabolic Panel; Future  -     TSH; Future  -     Vitamin B12; Future  -     Folate; Future  -     MRI Brain With & Without Contrast; Future  -     Ambulatory Referral to Physical Therapy Vestibular    2. Chronic nonintractable headache, unspecified headache type  -     MRI Brain With & Without Contrast; Future    3. Palpitations  -     Ambulatory Referral to Cardiology  -     ECG 12 Lead; Future  -     CBC (No Diff); Future  -     Comprehensive Metabolic Panel; Future  -     TSH; Future  -     Vitamin B12; Future  -     Folate; Future    4. Vertigo  -     Ambulatory Referral to Physical Therapy Vestibular    This patient is he is seen today with complaints of dizziness and vertigo, ongoing for about 5 years now.  He admits that he smokes marijuana regularly as well as uses hallucinogenic mushrooms regularly.  I have encouraged him to refrain from any drug use.  For his complaints I will check labs today as noted above, MRI of the brain with and without contrast ordered in consideration of tumor or lesion.  He does complain of significant palpitations that he experiences regularly, these can at times be related to occur with his dizziness, referral placed to cardiology, he will get EKG on his way out today.  I  have recommended that he push fluids and hydrate well, decrease caffeine and NSAID use  He is not interested in any daily medication for headache prevention, recommend that he use NSAIDs sparingly to decrease risk of medication overuse headache  Referral placed for vestibular PT  I would like to see him back here at next available clinic appointment, we will be in touch with him with results of the above testing as received  He is encouraged to reach out with any problems or concerns prior to follow-up appointment         Reviewed medications, potential side effects and signs and symptoms to report. Discussed risk versus benefits of treatment plan with patient and/or family-including medications, labs and radiology that may be ordered. Addressed questions and concerns during visit. Patient and/or family verbalized understanding and agree with plan.    AS THE PROVIDER, I PERSONALLY WORE PPE DURING ENTIRE FACE TO FACE ENCOUNTER IN CLINIC WITH THE PATIENT. PATIENT ALSO WORE PPE DURING ENTIRE FACE TO FACE ENCOUNTER EXCEPT FOR A MAX OF 30 SECONDS DURING NEUROLOGICAL EVALUATION OF CRANIAL NERVES AND THEN MASK WAS PLACED BACK OVER PATIENT FACE FOR REMAINDER OF VISIT. I WASHED MY HANDS BEFORE AND AFTER VISIT.    During this visit the following were done:  Labs Reviewed []    Labs Ordered []    Radiology Reports Reviewed []    Radiology Ordered []    PCP Records Reviewed []    Referring Provider Records Reviewed []    ER Records Reviewed []    Hospital Records Reviewed []    History Obtained From Family []    Radiology Images Reviewed []    Other Reviewed []    Records Requested []      Patricia Upton, МАРИНА  3/25/2022

## 2022-03-19 LAB
FOLATE SERPL-MCNC: >20 NG/ML (ref 4.78–24.2)
VIT B12 BLD-MCNC: 578 PG/ML (ref 211–946)

## 2022-03-21 NOTE — PROGRESS NOTES
Please let patient know his vitamin B12 and folic acid are in normal range  Thyroid study also stable.  His metabolic panel did not show any abnormalities  Fax a copy of labs to his PCP please

## 2022-03-23 ENCOUNTER — TELEPHONE (OUTPATIENT)
Dept: NEUROLOGY | Facility: CLINIC | Age: 32
End: 2022-03-23

## 2022-03-23 NOTE — TELEPHONE ENCOUNTER
----- Message from МАРИНА Moon sent at 3/21/2022  5:11 PM EDT -----  Please let patient know his vitamin B12 and folic acid are in normal range  Thyroid study also stable.  His metabolic panel did not show any abnormalities  Fax a copy of labs to his PCP please

## 2022-03-23 NOTE — TELEPHONE ENCOUNTER
----- Message from МАРИНА Moon sent at 3/21/2022  4:10 PM EDT -----  Please let pt know his EKG was read as normal

## 2022-04-22 ENCOUNTER — HOSPITAL ENCOUNTER (OUTPATIENT)
Dept: MRI IMAGING | Facility: HOSPITAL | Age: 32
Discharge: HOME | End: 2022-04-22
Admitting: NURSE PRACTITIONER

## 2022-04-22 DIAGNOSIS — G89.29 CHRONIC NONINTRACTABLE HEADACHE, UNSPECIFIED HEADACHE TYPE: ICD-10-CM

## 2022-04-22 DIAGNOSIS — R42 DIZZINESS: ICD-10-CM

## 2022-04-22 DIAGNOSIS — R51.9 CHRONIC NONINTRACTABLE HEADACHE, UNSPECIFIED HEADACHE TYPE: ICD-10-CM

## 2022-04-22 PROCEDURE — 0 GADOBENATE DIMEGLUMINE 529 MG/ML SOLUTION: Performed by: NURSE PRACTITIONER

## 2022-04-22 PROCEDURE — 70553 MRI BRAIN STEM W/O & W/DYE: CPT

## 2022-04-22 PROCEDURE — A9577 INJ MULTIHANCE: HCPCS | Performed by: NURSE PRACTITIONER

## 2022-04-22 RX ADMIN — GADOBENATE DIMEGLUMINE 15 ML: 529 INJECTION, SOLUTION INTRAVENOUS at 14:12

## 2022-04-25 NOTE — PROGRESS NOTES
Please notify Chaparro that the MRI of his brain overall looks within normal limits.  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  He does not have a Chiari malformation.  He does not have any crowding in the back of his head.  He does not have any concerning findings on the brain imaging.  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  There are no concerning findings.  Again both of these variations are common to see and do not explain his dizziness.  He should follow-up with МАРИНА Vanegas as scheduled in clinic to treat his headaches and his dizziness.  She can also review the imaging & findings more in detail at that time.  Thanks, МАРИНА Gallego.

## 2022-04-26 ENCOUNTER — TELEPHONE (OUTPATIENT)
Dept: NEUROLOGY | Facility: CLINIC | Age: 32
End: 2022-04-26

## 2022-04-26 NOTE — TELEPHONE ENCOUNTER
----- Message from МАРИНА Lloyd sent at 4/25/2022  3:24 PM EDT -----  Please notify Chaparro that the MRI of his brain overall looks within normal limits.  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  He does not have a Chiari malformation.  He does not have any crowding in the back of his head.  He does not have any concerning findings on the brain imaging.  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  There are no concerning findings.  Again both of these variations are common to see and do not explain his dizziness.  He should follow-up with МАРИНА Vanegas as scheduled in clinic to treat his headaches and his dizziness.  She can also review the imaging & findings more in detail at that time.  Thanks, МАРИНА Gallego.

## 2022-05-06 ENCOUNTER — OFFICE VISIT (OUTPATIENT)
Dept: CARDIOLOGY | Facility: CLINIC | Age: 32
End: 2022-05-06

## 2022-05-06 VITALS
WEIGHT: 181 LBS | BODY MASS INDEX: 24.52 KG/M2 | OXYGEN SATURATION: 94 % | DIASTOLIC BLOOD PRESSURE: 72 MMHG | SYSTOLIC BLOOD PRESSURE: 118 MMHG | HEIGHT: 72 IN | HEART RATE: 97 BPM

## 2022-05-06 DIAGNOSIS — R00.2 PALPITATIONS: Primary | ICD-10-CM

## 2022-05-06 PROCEDURE — 99203 OFFICE O/P NEW LOW 30 MIN: CPT | Performed by: PHYSICIAN ASSISTANT

## 2022-05-06 PROCEDURE — 93000 ELECTROCARDIOGRAM COMPLETE: CPT | Performed by: PHYSICIAN ASSISTANT

## 2022-05-06 RX ORDER — CALCIUM CARBONATE/VITAMIN D3 600 MG-10
TABLET ORAL
COMMUNITY

## 2022-05-06 RX ORDER — UBIDECARENONE 100 MG
100 CAPSULE ORAL DAILY
COMMUNITY
End: 2022-06-01

## 2022-05-06 RX ORDER — MULTIPLE VITAMINS W/ MINERALS TAB 9MG-400MCG
1 TAB ORAL DAILY
COMMUNITY

## 2022-05-06 NOTE — PROGRESS NOTES
"[facility]  INITIAL OFFICE CONSULT      Chaparro Villegas  1990  PCP: Chio Franco PA-C    SUBJECTIVE:   Chaparro Villegas is a 31 y.o. male seen for a consultation visit regarding the following:     Chief Complaint:   Chief Complaint   Patient presents with   • Palpitations   • Chest Pain          Consultation is requested by Patricia Upton, * for evaluation of Palpitations and Chest Pain        History:  Pleasant 31-year-old gentleman presents today for initial consultation regarding palpitations.  He states beginning after he was prescribed hydroxyzine for anxiety and vertigo symptoms.  He had vertigo a few months ago this is now gradually resolving he did use meclizine for some time as well.  He describes palpitations as occasional \"skipped beats\".  These are random in nature not associate with dizziness near syncope or syncope.  Although he reports he does get anxious when he has more these.  When he took hydroxyzine he had sudden onset of a rapid heartbeat this lasted all throughout the night he felt his heart rate was over 100.  He has not had the same experience since stopping hydroxyzine.  He is not exercising on a basis but with physical activities he denies he is having any chest pain or chest tightness.  Denies any heart failure symptoms.  He denies any near syncope events.  He does know he is quite severe anxiety is working with a counselor.  Unfortunately is also been self dosing MDMA and psychedelic mushrooms for his seasonal depression.  He is a long-term nicotine user and is trying to cut back he went from cigarettes to vaping but now back to cigarettes and nicotine patches.  Overall today he wants evaluation regarding his palpitations to make sure everything is \"okay\".      Cardiac PMH: (Old records have been reviewed and summarized below)  1. Palpitations  2. Recent Vertigo  3. Tension headaches  4. Right shoulder dislocation  5. Family history of " PFO in Brother.   6. Illicit drug use, MDMA, psychedelic mushrooms  7. Depression/anxiety, patient working with a therapist        Past Medical History, Past Surgical History, Family history, Social History, and Medications were all reviewed with the patient today and updated as necessary.     Current Outpatient Medications   Medication Sig Dispense Refill   • coenzyme Q10 100 MG capsule Take 100 mg by mouth Daily.     • fluticasone (FLONASE) 50 MCG/ACT nasal spray 2 sprays into the nostril(s) as directed by provider Every Night for 30 days. Administer 2 sprays in each nostril for each dose. 18.2 mL 0   • ibuprofen (ADVIL,MOTRIN) 600 MG tablet Take 600 mg by mouth Every 6 (Six) Hours As Needed for Mild Pain .     • loratadine-pseudoephedrine (CLARITIN-D 24-hour)  MG per 24 hr tablet Take 1 tablet by mouth Daily.     • multivitamin with minerals (MULTIVITAMIN MEN PO) Take 1 tablet by mouth Daily.     • Omega 3 1200 MG capsule Take  by mouth.     • Vitamin B-2 (RIBOFLAVIN) 100 MG tablet tablet Take 100 mg by mouth Daily.     • CBD oil (cannabidiol) capsule Take 60 capsules by mouth Every Morning. For anxiety       No current facility-administered medications for this visit.     Allergies   Allergen Reactions   • Penicillins Anaphylaxis         Past Medical History:   Diagnosis Date   • Cluster headache [id number]    Regular occurence (at least weekly) roughly 7132-5874; cause never determined   • Headache    • Headache, tension-type [id number]    Semi-regular occurence through adulthood   • Hypertension [id number]    Doctors have report slight elevation from normal levels     History reviewed. No pertinent surgical history.  Family History   Problem Relation Age of Onset   • Arthritis Mother    • Colon polyps Mother    • Migraines Mother    • Colon polyps Father    • Hypertension Father    • Parkinsonism Father    • Learning disabilities Brother    • Mental illness Brother    • Stroke Maternal Aunt    • Mental illness  "Paternal Uncle    • Arthritis Maternal Grandmother    • Migraines Maternal Grandmother    • Osteoporosis Maternal Grandmother    • Breast cancer Paternal Grandmother    • Diabetes Paternal Grandmother    • Mental illness Paternal Grandmother    • Stroke Paternal Grandfather    • Heart attack Paternal Grandfather      Social History     Tobacco Use   • Smoking status: Current Every Day Smoker     Packs/day: 0.25     Years: 5.00     Pack years: 1.25     Types: Electronic Cigarette     Start date: 2006     Last attempt to quit: 2011     Years since quittin.3   • Smokeless tobacco: Never Used   • Tobacco comment: Off and on for 15 years between cigarettes, e-cigarettes and oral delivery   Substance Use Topics   • Alcohol use: No       ROS:  Review of Symptoms:  General: no recent weight loss/gain, weakness or fatigue  Skin: no rashes, lumps, or other skin changes  HEENT: Positive for vertigo  Respiratory: no cough or hemoptysis  Cardiovascular: + palpitations, and tachycardia  Gastrointestinal: no black/tarry stools or diarrhea  Urinary: no change in frequency or urgency  Peripheral Vascular: no claudication or leg cramps  Musculoskeletal: no muscle or joint pain/stiffness  Psychiatric: Depression seasonal and anxiety  Neurological: no sensory or motor loss, no syncope  Hematologic: no anemia, easy bruising or bleeding  Endocrine: no thyroid problems, nor heat or cold intolerance         PHYSICAL EXAM:   /72 (BP Location: Right arm, Patient Position: Sitting, Cuff Size: Adult)   Pulse 97   Ht 182.9 cm (72\")   Wt 82.1 kg (181 lb)   SpO2 94%   BMI 24.55 kg/m²      Wt Readings from Last 5 Encounters:   22 82.1 kg (181 lb)   22 82.1 kg (181 lb)   21 84.4 kg (186 lb)   21 86.2 kg (190 lb)   20 86.2 kg (190 lb)     BP Readings from Last 5 Encounters:   22 118/72   22 124/72   21 122/86   21 137/85   20 118/78       General-Well Nourished, " Well developed  Eyes - PERRLA  Neck- supple, No mass  CV- regular rate and rhythm, no MRG  Lung- clear bilaterally  Abd- soft, +BS  Musc/skel - Norm strength and range of motion  Skin- warm and dry  Neuro - Alert & Oriented x 3, appropriate mood.    Patient's external notes were reviewed.  Independent interpretation of test performed by another physician in facility were reviewed.  Outside laboratory data was also reviewed.    Medical problems and test results were reviewed with the patient today.     Results for orders placed or performed in visit on 03/18/22   ECG 12 Lead   Result Value Ref Range    QT Interval 336 ms    QTC Interval 404 ms         No results found for: CHOL, HDL, HDLC, LDL, LDLC, VLDL    EKG:  (EKG/Tracing has been independently visualized by me and summarized below)      ECG 12 Lead    Date/Time: 5/6/2022 1:30 PM  Performed by: Ever Noel PA  Authorized by: Ever Noel PA   Rhythm: sinus rhythm  Rate: normal  Conduction: conduction normal  ST Segments: ST segments normal  T Waves: T waves normal  QRS axis: normal  Other: no other findings    Clinical impression: normal ECG            ASSESSMENT   1. Palpitations: Severe episode tachypalpitations hydroxyzine therapy now resolved.  Continues to have palpitations most likely PACs or PVCs  2. Nicotine use: Discussed need for immediate cessation  3. Anxiety, Depression.  4.  Illicit drug use: Discussed need for immediate cessation  5.  Vertigo, headaches: Followed by neurology.  Recent MRI scan.      PLAN  · ZIO monitor  · Echocardiogram  · Discussed the need for nicotine and illicit drug use cessation.  · He would like to defer any medical therapy currently for his palpitations she would like to avoid having take beta-blockers or calcium channel blocker at this time.  · Return follow-up in 8 weeks or sooner as needed        Cardiology/Electrophysiology  05/06/22  13:29 EDT  Will Bert VEGA

## 2022-06-01 PROCEDURE — 87086 URINE CULTURE/COLONY COUNT: CPT | Performed by: FAMILY MEDICINE

## 2022-06-06 ENCOUNTER — OFFICE VISIT (OUTPATIENT)
Dept: FAMILY MEDICINE CLINIC | Facility: CLINIC | Age: 32
End: 2022-06-06

## 2022-06-06 VITALS
HEIGHT: 72 IN | HEART RATE: 89 BPM | DIASTOLIC BLOOD PRESSURE: 78 MMHG | OXYGEN SATURATION: 100 % | SYSTOLIC BLOOD PRESSURE: 118 MMHG | TEMPERATURE: 97.2 F | WEIGHT: 180 LBS | BODY MASS INDEX: 24.38 KG/M2

## 2022-06-06 DIAGNOSIS — R31.9 HEMATURIA, UNSPECIFIED TYPE: Primary | ICD-10-CM

## 2022-06-06 DIAGNOSIS — N20.0 KIDNEY STONE: ICD-10-CM

## 2022-06-06 LAB
BILIRUB BLD-MCNC: NEGATIVE MG/DL
CLARITY, POC: CLEAR
COLOR UR: YELLOW
EXPIRATION DATE: ABNORMAL
GLUCOSE UR STRIP-MCNC: NEGATIVE MG/DL
KETONES UR QL: NEGATIVE
LEUKOCYTE EST, POC: NEGATIVE
Lab: ABNORMAL
NITRITE UR-MCNC: NEGATIVE MG/ML
PH UR: 7.5 [PH] (ref 5–8)
PROT UR STRIP-MCNC: NEGATIVE MG/DL
RBC # UR STRIP: ABNORMAL /UL
SP GR UR: 1.01 (ref 1–1.03)
UROBILINOGEN UR QL: NORMAL

## 2022-06-06 PROCEDURE — 81003 URINALYSIS AUTO W/O SCOPE: CPT | Performed by: PHYSICIAN ASSISTANT

## 2022-06-06 PROCEDURE — 99212 OFFICE O/P EST SF 10 MIN: CPT | Performed by: PHYSICIAN ASSISTANT

## 2022-06-06 NOTE — PROGRESS NOTES
Subjective   Chaparro Villegas is a 31 y.o. male  Blood in Urine (Follow up on blood in urine, back pain, seen at .)      History of Present Illness     Patient is a 31-year-old male seen today for follow-up on hematuria, which was noted at urgent care recently.    The patient reports that he is feeling better than he was last week. He states that he was on the downstream of his sinus infections. He notes this is first time he has been sick since COVID-19. The patient woke up on Wednesday, 06/01/2022, around 5:00 AM and had to urinate urgently which is very abnormal for him. He reports that he never really had to urinate in the past, nothing normally wakes him up. He states that he attempted to urinate and got a couple of ounces out. He notes that the feeling to urinate did not go away. He reports that he tried to lay down again and kept having the sensation like he needed to urinate, but it just felt like he could not get his urine out. The patients stream was not seeming to empty like it should. He has never had a urinary tract infection.    He reports a pressure like sensation. He denies blood stools. He notes that there potentially there could have been a coloration there, but it was not anything that was obviously bloody. The patient was still overcoming his sickness. He states his head was still very tight and his symptoms occurred early in the morning. He reports that at some point, he took a shower and this went on for about 2.5 hours. He notes that he attempted to defecate in the morning and that is when his back had a sensation of a lot of pressure when he sat down to have a bowel movement. He states the pain started hitting and he started sweating very badly. The patients pain got so bad he had to have his girlfriend assist him to lay down in bed. He reports that he was able to find a semi comfortable position and sleep for approximately 1.5 hours. He notes that when he woke up, his back was not quite there  and he knew that he needed to see someone.    He states that he did not lose control of his urine or bowels. The patient reports that he found that the urethra control was not right because when he would get up, there would still be a little dribble and he went to urgent care. He notes that this happened prior to him having a sore throat. The patients symptoms were similar to when he contacted Covid-19 due to the urethral pain when urinating, sinus and throat problems. He states that he was tested for strep and Covid-19 and was negative. He reports that he has been actively working on staying hydrated. He notes he is working on limited his soda intake, which he has. He still experiences a slight dull discomforting feeling although he is recovering.    The patient reports that he has a follow-up with the neurologist 06/10/2022. He states that he has appointment on 06/17/2022 with the cardiologist. He notes that he was referred by the cardiologist.     The patient reports he has started therapy.     The following portions of the patient's history were reviewed and updated as appropriate: allergies, current medications, past social history and problem list    Review of Systems   Constitutional: Negative.    Genitourinary: Positive for decreased urine volume, difficulty urinating, dysuria, flank pain, hematuria and urgency.   Musculoskeletal: Positive for back pain.   Neurological: Positive for light-headedness.   Psychiatric/Behavioral: Positive for sleep disturbance.       Objective     Vitals:    06/06/22 1614   BP: 118/78   Pulse: 89   Temp: 97.2 °F (36.2 °C)   SpO2: 100%       Physical Exam  Constitutional:       General: He is not in acute distress.     Appearance: Normal appearance. He is well-developed. He is not ill-appearing, toxic-appearing or diaphoretic.   HENT:      Head: Normocephalic and atraumatic.   Eyes:      Conjunctiva/sclera: Conjunctivae normal.   Pulmonary:      Effort: Pulmonary effort is  normal. No respiratory distress.   Skin:     General: Skin is dry.      Coloration: Skin is not pale.      Findings: No erythema or rash.   Neurological:      Mental Status: He is alert and oriented to person, place, and time.      Coordination: Coordination normal.   Psychiatric:         Attention and Perception: He is attentive.         Mood and Affect: Mood normal.         Speech: Speech normal.         Behavior: Behavior normal.         Thought Content: Thought content normal.         Judgment: Judgment normal.         Assessment & Plan     Diagnoses and all orders for this visit:    1. Hematuria, unspecified type (Primary)  -     POC Urinalysis Dipstick, Automated    2. Kidney stone      1. History of kidney stones and hematuria  I advised if his urinary flow symptoms return to follow up to undergo a CT scan. I informed the patient to stay hydrated, drink plenty of water and limit soda intake.      Transcribed from ambient dictation for Chio Franco PA-C by Andry Browne.  06/06/22   19:04 EDT    Patient verbalized consent to the visit recording.

## 2022-06-10 ENCOUNTER — OFFICE VISIT (OUTPATIENT)
Dept: NEUROLOGY | Facility: CLINIC | Age: 32
End: 2022-06-10

## 2022-06-10 VITALS
HEART RATE: 160 BPM | SYSTOLIC BLOOD PRESSURE: 124 MMHG | TEMPERATURE: 97.1 F | WEIGHT: 182 LBS | HEIGHT: 72 IN | BODY MASS INDEX: 24.65 KG/M2 | OXYGEN SATURATION: 100 % | DIASTOLIC BLOOD PRESSURE: 90 MMHG

## 2022-06-10 DIAGNOSIS — R42 DIZZINESS: ICD-10-CM

## 2022-06-10 DIAGNOSIS — R42 VERTIGO: Primary | ICD-10-CM

## 2022-06-10 PROCEDURE — 99213 OFFICE O/P EST LOW 20 MIN: CPT | Performed by: NURSE PRACTITIONER

## 2022-06-10 RX ORDER — UBIDECARENONE 100 MG
100 CAPSULE ORAL DAILY
COMMUNITY

## 2022-06-10 NOTE — PROGRESS NOTES
"Subjective:     Patient ID: Chaparro Villegas is a 31 y.o. male.    CC:   Chief Complaint   Patient presents with   • Dizziness       HPI:   History of Present Illness     Mr. Villegas is here today for follow-up.  I first saw him a couple months ago to establish care for dizziness and vertigo.    He told me he had been experiencing this for at least 5 years now.  He said that he notices problems with vertigo and dizziness mostly with neck movements, when he looks up or down he can usually elicit some type of dizziness.  He described both the sensation as if the room is spinning as well as lightheadedness intermittently.  He said he experiences \"decreased latency with eye movements\".  In November 2020 when he had 2 episodes very close together where he he had what he described as less spatial awareness than normal for him, he had a spinning sensation lasting 20 to 30 seconds, this occurred while he was seated at work, he was not overly stressed at the moment.  He has had no significant episodes since that time.  After the spell he did go to the [facility], he was given a prescription for both hydroxyzine and meclizine and was treated for sinus issues with allergy medications.  He also reported having occasional headaches, he was diagnosed with cluster headache when he was 11 or 12 years old, cluster headaches stopped when he started smoking marijuana back in 2015.  When he has headaches now he typically wakes up with them in the morning, he will take 3 ibuprofen which tend to help, this is occurring about 3-4 times a week.   He complained of a dull ache holocranial with mild nausea, no photo or phonophobia.  His headaches are rated as mild, 4 out of 10 on pain scale.  He denied double vision or vision change, he is up-to-date on eye exam.  He denied any confusional episodes, slurred speech, paresthesias, syncope, fainting or falls.  He also at the time admitted to smoking marijuana regularly and using MDMA micro dosing for " "mood  He complained to me that he had frequent palpitations with rapid heart rate, I did refer him to cardiology.  He has had a Holter monitor, he does not have results yet.  He continues to have elevations in heart rate, initially heart rate was elevated today but he says he has been using oral nicotine, was very stressed prior to coming in and is also been using some Sudafed with sinus medications due to recent sinusitis    Since he was here last he had an MRI of the brain which showed very mild tonsillar ectopy with no evidence of Chiari.  He had some slight asymmetry in ventricles that radiology felt was likely an anatomic variant  He says that overall he is improving, he still has episodes of vertigo with position changes but this seems to be not as pronounced or often.  He has not had any significant spells of \"abnormal spatial awareness\"  He is feeling a bit improved in general.  Cardiology did order an echo, financially he has been unable to complete this but has has a follow-up with cardiologist next week  The following portions of the patient's history were reviewed and updated as appropriate: allergies, current medications, past family history, past medical history, past social history, past surgical history and problem list.    Past Medical History:   Diagnosis Date   • Cluster headache [id number]    Regular occurence (at least weekly) roughly [id number]; cause never determined   • Headache    • Headache, tension-type [id number]    Semi-regular occurence through adulthood   • Hypertension [id number]    Doctors have report slight elevation from normal levels       No past surgical history on file.    Social History     Socioeconomic History   • Marital status: Single   Tobacco Use   • Smoking status: Current Every Day Smoker     Packs/day: 0.25     Years: 5.00     Pack years: 1.25     Types: Electronic Cigarette     Start date: 2006     Last attempt to quit: 2011     Years since quittin.4   • " "Smokeless tobacco: Never Used   • Tobacco comment: Off and on for 15 years between cigarettes, e-cigarettes and oral delivery   Vaping Use   • Vaping Use: Every day   • Substances: Nicotine   • Devices: Disposable   Substance and Sexual Activity   • Alcohol use: No   • Drug use: Yes     Types: Marijuana   • Sexual activity: Yes     Partners: Female     Birth control/protection: None     Comment: Monogamous - 9 years       Family History   Problem Relation Age of Onset   • Arthritis Mother    • Colon polyps Mother    • Migraines Mother    • Colon polyps Father    • Hypertension Father    • Parkinsonism Father    • Learning disabilities Brother    • Mental illness Brother    • Stroke Maternal Aunt    • Mental illness Paternal Uncle    • Arthritis Maternal Grandmother    • Migraines Maternal Grandmother    • Osteoporosis Maternal Grandmother    • Breast cancer Paternal Grandmother    • Diabetes Paternal Grandmother    • Mental illness Paternal Grandmother    • Stroke Paternal Grandfather    • Heart attack Paternal Grandfather         Review of Systems   Constitutional: Negative.    HENT: Negative.    Respiratory: Negative.    Cardiovascular: Positive for palpitations.   Neurological: Positive for dizziness and light-headedness. Negative for tremors, seizures, syncope, facial asymmetry, speech difficulty, weakness and numbness.        Objective:  /90   Pulse (!) 160   Temp 97.1 °F (36.2 °C)   Ht 182.9 cm (72\")   Wt 82.6 kg (182 lb)   SpO2 100%   BMI 24.68 kg/m²     Neurologic Exam     Mental Status   Oriented to person, place, and time.   Attention: normal. Concentration: normal.   Speech: speech is normal   Level of consciousness: alert  Knowledge: consistent with education.   Able to read. Able to write. Normal comprehension.     Cranial Nerves   Cranial nerves II through XII intact.     CN III, IV, VI   Pupils are equal, round, and reactive to light.  Extraocular motions are normal.   Right pupil: Shape: " regular. Reactivity: brisk. Consensual response: intact. Accommodation: intact.   Left pupil: Shape: regular. Reactivity: brisk. Consensual response: intact. Accommodation: intact.   CN III: no CN III palsy  CN VI: no CN VI palsy  Nystagmus: none   Upgaze: normal  Downgaze: normal    CN V   Facial sensation intact.     CN VII   Facial expression full, symmetric.     CN VIII   CN VIII normal.     CN IX, X   CN IX normal.   CN X normal.     CN XI   CN XI normal.     CN XII   CN XII normal.     Motor Exam   Muscle bulk: normal  Overall muscle tone: normal  Right arm tone: normal  Left arm tone: normal  Right arm pronator drift: absent  Left arm pronator drift: absent  Right leg tone: normal  Left leg tone: normal    Strength   Strength 5/5 throughout.     Sensory Exam   Light touch normal.     Gait, Coordination, and Reflexes     Gait  Gait: normal    Coordination   Romberg: negative  Finger to nose coordination: normal  Heel to shin coordination: normal  Tandem walking coordination: normal    Tremor   Resting tremor: absent  Intention tremor: absent  Action tremor: absent    Reflexes   Reflexes 2+ except as noted.   Right plantar: normal  Left plantar: normal  Right Ace: absent  Left Ace: absent      Physical Exam  Vitals reviewed.   Constitutional:       Appearance: He is well-developed.   HENT:      Head: Normocephalic and atraumatic.   Eyes:      General: No scleral icterus.     Extraocular Movements: EOM normal.      Conjunctiva/sclera: Conjunctivae normal.      Pupils: Pupils are equal, round, and reactive to light.   Pulmonary:      Effort: Pulmonary effort is normal. No respiratory distress.   Musculoskeletal:      Cervical back: Normal range of motion and neck supple.   Skin:     General: Skin is warm.      Capillary Refill: Capillary refill takes less than 2 seconds.   Neurological:      General: No focal deficit present.      Mental Status: He is alert and oriented to person, place, and time.       "Coordination: Finger-Nose-Finger Test, Heel to Shin Test and Romberg Test normal.      Gait: Gait is intact. Tandem walk normal.      Deep Tendon Reflexes: Strength normal.   Psychiatric:         Mood and Affect: Mood normal.         Speech: Speech normal.         Behavior: Behavior normal.         Thought Content: Thought content normal.         Judgment: Judgment normal.         Assessment/Plan:       Diagnoses and all orders for this visit:    1. Vertigo (Primary)    2. Dizziness    Mr. Villegas reports his dizziness and vertigo seem to be a bit improved, he has not had any further \"spells\".  He has seen cardiology, had Holter, and will be getting results next week.  He was unable to complete echo due to cost.  I had sent to physical therapy after last visit but he has been unable to make that appointment due to cost as well.  He has stopped using MDMA by report.  Initially when he came to the office his heart rate was elevated, he has been using oral nicotine as well as taking some Sudafed for sinusitis  , He will let cardiology know if he has persistent elevations in heart rate.  He is not feeling dizzy or having any chest pain here in the office today.   He did tell MA that this is a normal variation in his heart rate and he did discuss this with cardiology  If he continues to have episodes I do recommend that he start vestibular therapy as previously ordered, consider ENT referral for VNG and order EEG  Patient would like to hold on any further testing at this time, I encouraged him to reach out to me at any time if he changes his mind and I can place orders for him  Keep follow-up with cardiology  We will see him back here in a couple months but I encouraged him to reach out with any problems or concerns prior to follow-up appointment         Reviewed medications, potential side effects and signs and symptoms to report. Discussed risk versus benefits of treatment plan with patient and/or family-including " medications, labs and radiology that may be ordered. Addressed questions and concerns during visit. Patient and/or family verbalized understanding and agree with plan.    AS THE PROVIDER, I PERSONALLY WORE PPE DURING ENTIRE FACE TO FACE ENCOUNTER IN CLINIC WITH THE PATIENT. PATIENT ALSO WORE PPE DURING ENTIRE FACE TO FACE ENCOUNTER EXCEPT FOR A MAX OF 30 SECONDS DURING NEUROLOGICAL EVALUATION OF CRANIAL NERVES AND THEN MASK WAS PLACED BACK OVER PATIENT FACE FOR REMAINDER OF VISIT. I WASHED MY HANDS BEFORE AND AFTER VISIT.    During this visit the following were done:  Labs Reviewed []    Labs Ordered []    Radiology Reports Reviewed []    Radiology Ordered []    PCP Records Reviewed []    Referring Provider Records Reviewed []    ER Records Reviewed []    Hospital Records Reviewed []    History Obtained From Family []    Radiology Images Reviewed []    Other Reviewed []    Records Requested []      Patricia Upton, МАРИНА  6/10/2022

## 2022-06-17 ENCOUNTER — OFFICE VISIT (OUTPATIENT)
Dept: CARDIOLOGY | Facility: CLINIC | Age: 32
End: 2022-06-17

## 2022-06-17 VITALS
HEART RATE: 98 BPM | SYSTOLIC BLOOD PRESSURE: 120 MMHG | DIASTOLIC BLOOD PRESSURE: 72 MMHG | OXYGEN SATURATION: 99 % | BODY MASS INDEX: 24.65 KG/M2 | HEIGHT: 72 IN | WEIGHT: 182 LBS

## 2022-06-17 DIAGNOSIS — R00.2 PALPITATIONS: Primary | ICD-10-CM

## 2022-06-17 PROCEDURE — 93000 ELECTROCARDIOGRAM COMPLETE: CPT | Performed by: PHYSICIAN ASSISTANT

## 2022-06-17 PROCEDURE — 99213 OFFICE O/P EST LOW 20 MIN: CPT | Performed by: PHYSICIAN ASSISTANT

## 2022-06-17 NOTE — PROGRESS NOTES
[facility]   OFFICE NOTE      Chaparro Villegas  1990  PCP: Chio Franco PA-C    SUBJECTIVE:   Chaparro Villegas is a 31 y.o. male seen for a follow up visit regarding the following:     CC:Palpitations    HPI:   Pleasant 31 year old male returns today regarding palpitations.  He wore14-day monitor revealing sinus tachycardia.  He has made some lifestyle changes including quit vaping reducing, nicotine intake and increasing hydration.  He reports some episodes going from a sitting standing position feeling lightheaded.  Otherwise he has had no andrew syncope.  No any chest pain or chest tightness testing angina pectoris.  Denies heart failure symptoms.  He states that overall he feels he is doing well he continue to follow with neurology with further testing.  He is interested in pursuing echocardiac some time but he is try to work out cost with his insurance company at this time.    Cardiac PMH: (Old records have been reviewed and summarized below)  1. Palpitations  2. Recent Vertigo  3. Tension headaches  4. Right shoulder dislocation  5. Family history of PFO in Brother.   6. Illicit drug use, MDMA, psychedelic mushrooms  7. Depression/anxiety, patient working with a therapist      Past Medical History, Past Surgical History, Family history, Social History, and Medications were all reviewed with the patient today and updated as necessary.       Current Outpatient Medications:   •  coenzyme Q10 100 MG capsule, Take 100 mg by mouth Daily., Disp: , Rfl:   •  ibuprofen (ADVIL,MOTRIN) 600 MG tablet, Take 600 mg by mouth Every 6 (Six) Hours As Needed for Mild Pain ., Disp: , Rfl:   •  loratadine-pseudoephedrine (CLARITIN-D 24-hour)  MG per 24 hr tablet, Take 1 tablet by mouth Daily., Disp: , Rfl:   •  Magnesium 100 MG capsule, Take  by mouth., Disp: , Rfl:   •  multivitamin with minerals tablet tablet, Take 1 tablet by mouth Daily., Disp: , Rfl:   •  Omega 3 1200 MG  "capsule, Take  by mouth., Disp: , Rfl:   •  Vitamin B-2 (RIBOFLAVIN) 100 MG tablet tablet, Take 100 mg by mouth Daily., Disp: , Rfl:   •  fluticasone (FLONASE) 50 MCG/ACT nasal spray, 2 sprays into the nostril(s) as directed by provider Every Night for 30 days. Administer 2 sprays in each nostril for each dose., Disp: 18.2 mL, Rfl: 0      Allergies   Allergen Reactions   • Penicillins Anaphylaxis         PHYSICAL EXAM:    /72 (BP Location: Right arm, Patient Position: Sitting)   Pulse 98   Ht 182.9 cm (72\")   Wt 82.6 kg (182 lb)   SpO2 99%   BMI 24.68 kg/m²        Wt Readings from Last 5 Encounters:   06/17/22 82.6 kg (182 lb)   06/10/22 82.6 kg (182 lb)   06/06/22 81.6 kg (180 lb)   06/01/22 82.6 kg (182 lb)   05/06/22 82.1 kg (181 lb)       BP Readings from Last 5 Encounters:   06/17/22 120/72   06/10/22 124/90   06/06/22 118/78   06/01/22 120/79   05/06/22 118/72       General appearance - Alert, well appearing, and in no distress   Mental status - Affect appropriate to mood.  Eyes - Sclerae anicteric,  ENMT - Hearing grossly normal bilaterally, Dental hygiene good.  Neck - Carotids upstroke normal bilaterally, no bruits, no JVD.  Resp - Clear to auscultation, no wheezes, rales or rhonchi, symmetric air entry.  Heart - Normal rate, regular rhythm, normal S1, S2, no murmurs, rubs, clicks or gallops.  GI - Soft, nontender, nondistended, no masses or organomegaly.  Neurological - Grossly intact - normal speech, no focal findings  Musculoskeletal - No joint tenderness, deformity or swelling, no muscular tenderness noted.  Extremities - Peripheral pulses normal, no pedal edema, no clubbing or cyanosis.  Skin - Normal coloration and turgor.  Psych -  oriented to person, place, and time.    Medical problems and test results were reviewed with the patient today.     Recent Results (from the past 672 hour(s))   POCT Rapid Strep A    Collection Time: 06/01/22 10:15 AM    Specimen: Swab   Result Value Ref Range "    Rapid Strep A Screen Negative Negative, VALID, INVALID, Not Performed    Internal Control Passed Passed    Lot Number [id number]     Expiration Date 09/30/2023    POC Urinalysis Dipstick, Multipro (Automated dipstick)    Collection Time: 06/01/22 10:15 AM    Specimen: Urine   Result Value Ref Range    Color Yellow Yellow, Straw, Dark Yellow, Sallie    Clarity, UA Clear Clear    Glucose, UA Negative Negative, 1000 mg/dL (3+) mg/dL    Bilirubin Negative Negative    Ketones, UA Negative Negative    Specific Gravity  1.005 1.005 - 1.030    Blood, UA 3+ (A) Negative    pH, Urine 5.5 5.0 - 8.0    Protein, POC Negative Negative mg/dL    Urobilinogen, UA Normal Normal    Nitrite, UA Negative Negative    Leukocytes Negative Negative   Urine Culture - Urine, Urine, Clean Catch    Collection Time: 06/01/22 10:35 AM    Specimen: Urine, Clean Catch   Result Value Ref Range    Urine Culture No growth    POC Urinalysis Dipstick, Automated    Collection Time: 06/06/22  4:23 PM    Specimen: Urine   Result Value Ref Range    Color Yellow Yellow, Straw, Dark Yellow, Sallie    Clarity, UA Clear Clear    Specific Gravity  1.015 1.005 - 1.030    pH, Urine 7.5 5.0 - 8.0    Leukocytes Negative Negative    Nitrite, UA Negative Negative    Protein, POC Negative Negative mg/dL    Glucose, UA Negative Negative, 1000 mg/dL (3+) mg/dL    Ketones, UA Negative Negative    Urobilinogen, UA Normal Normal    Bilirubin Negative Negative    Blood, UA Trace (A) Negative    Lot Number [id number]     Expiration Date 01/08/2023          EKG: (EKG has been independently visualized by me and summarized below)    ECG 12 Lead    Date/Time: 6/17/2022 1:49 PM  Performed by: Ever Noel PA  Authorized by: Ever Noel PA   Rhythm: sinus rhythm  Rate: normal  Conduction: conduction normal  ST Segments: ST segments normal  T Waves: T waves normal  QRS axis: normal  Other: no other findings    Clinical impression: normal  ECG              ASSESSMENT   1. Palpitations: ZIO monitor placed on May 6/22 worn for 14 days revealed average heart rate 91 bpm.  Occasional PVCs 1 episode of 4 beats of atrial tachycardia.  Sinus tachycardia were attributed to patient's activation of symptom log.  Maximal heart rate 174 bpm.  Episodes his symptoms were sinus tachycardia.  2.  Nicotine use:  patient has stopped vaping does continue to use some nicotine or pouches occasionally.  We discussed need for cessation.  3.  Micro dosing of illicit drugs occluding MDMA and psychedelic mushrooms    PLAN  · Reviewed monitor the patient.  Activation on the monitor was with sinus tachycardia.  We discussed how other factors may be playing a role in this including marginal blood pressure versus autonomic system disease versus anxiety.  He is trying to change lifestyle reducing nicotine increasing hydration.  He will pursue echocardiogram in the near future he is awaiting other tests as well.  · He declines medical therapy now he will return follow-up with our office as needed basis or if he has any change in symptoms.      6/17/2022  13:48 EDT    Will Bert VEGA

## 2022-10-04 PROCEDURE — U0004 COV-19 TEST NON-CDC HGH THRU: HCPCS | Performed by: PHYSICIAN ASSISTANT

## 2023-03-30 ENCOUNTER — OFFICE VISIT (OUTPATIENT)
Dept: FAMILY MEDICINE CLINIC | Facility: CLINIC | Age: 33
End: 2023-03-30
Payer: COMMERCIAL

## 2023-03-30 VITALS
DIASTOLIC BLOOD PRESSURE: 82 MMHG | TEMPERATURE: 97.2 F | HEIGHT: 72 IN | HEART RATE: 87 BPM | WEIGHT: 197.6 LBS | OXYGEN SATURATION: 99 % | SYSTOLIC BLOOD PRESSURE: 134 MMHG | BODY MASS INDEX: 26.76 KG/M2

## 2023-03-30 DIAGNOSIS — J31.2 SORE THROAT, CHRONIC: ICD-10-CM

## 2023-03-30 DIAGNOSIS — K21.9 GASTROESOPHAGEAL REFLUX DISEASE, UNSPECIFIED WHETHER ESOPHAGITIS PRESENT: ICD-10-CM

## 2023-03-30 DIAGNOSIS — R05.3 CHRONIC COUGH: ICD-10-CM

## 2023-03-30 DIAGNOSIS — Z00.00 GENERAL MEDICAL EXAM: Primary | ICD-10-CM

## 2023-03-30 DIAGNOSIS — R49.0 CHRONIC HOARSENESS: ICD-10-CM

## 2023-03-30 PROCEDURE — 99395 PREV VISIT EST AGE 18-39: CPT | Performed by: PHYSICIAN ASSISTANT

## 2023-03-30 RX ORDER — BUPROPION HYDROCHLORIDE 75 MG/1
1 TABLET ORAL DAILY
COMMUNITY
Start: 2023-01-15

## 2023-03-30 RX ORDER — BUPROPION HYDROCHLORIDE 150 MG/1
150 TABLET ORAL EVERY MORNING
COMMUNITY
Start: 2023-01-21

## 2023-03-30 RX ORDER — OMEPRAZOLE 40 MG/1
40 CAPSULE, DELAYED RELEASE ORAL DAILY
Qty: 30 CAPSULE | Refills: 1 | Status: SHIPPED | OUTPATIENT
Start: 2023-03-30

## 2023-03-30 NOTE — PROGRESS NOTES
Subjective   Chaparro Villegas is a 32 y.o. male  Annual Exam (Annual physical) and URI (Possible chest congestion and wheezing, since stopping vaping )      History of Present Illness    CHAPARRO VILLEGAS, date of birth 1990, presents today for an annual physical.    The patient reports that he was a nicotine user for 15 years. He states that he started smoking cigarettes for 7 years and switched to vaping in college. The patient reports that he did that for approximately 7 years. He states that over the pandemic, he quit vaping and started using the oral patches. The patient reports that he started buying a house and he started smoking again. He states that when he went back to vaping, he made his own nicotine solutions for vaping for years. The patient reports that he switched to a gas station vapes because he is not getting back into vaping. He states that he will do this until he can wean off of it. The patient reports that he reached out to someone about doing some air quality test in his house as well because he is not sure if it does have good mold. He states that it is an older house and there are some water issues. The patient reports that over the course of 1.5 years, he has lost the bass in his voice. The patient reports that he has been off of marijuana since 11/2022. He states that he is still using marijuana. The patient reports that he switched to dry flower vaporization. He states that he is not fully combusting anything with fire at this point. The marijuana products that he is using is pretty consistent purity. He states that when he buys marijuana, he just makes large purchases twice a year. The patient reports that he had a long-term relationship with a girl who was born with a rare condition that was basically caused very frequent and quick growth of polyps in her throat. He states that her mother had HPV when she was born. The patient reports that she was having surgery once every year. The  patient reports that he went into urgent care for kidney stones. He states that they checked his mouth and they told him that it looked like he was on the verge of a sinus infection. The patient reports that he is frequently having that feeling. He states that he is not seeing COPD related to vape use at this point. The patient reports that the Wellbutrin and quitting nicotine did help. He states that he was using the pouches on the gums. The patient reports that he had that moment that it was not really doing the trick. He states that he did not realize that Zyban was bupropion. The patient reports that he has been in counseling for the past year. He states that when this winter hit, his counselor told him that they needed to do something. The patient reports that he has an appointment with his psychiatrist that is doing his prescriptions. He states that she is seeing that very frequently with it because a lot of times depressive personalities. The patient reports that he has had a consistent desire to quit for the entire time he was doing it. He states that he has been terrified to try Chantix because of what he has heard. The patient reports that he has not noticed any unusual sores that are not healing on his tongue or gums. He states that his breath has been terrible. The patient reports that his girlfriend points out all the time that there is a very specific smell that his breath gets sometimes. He states that he has been getting higher that more frequently. The patient reports that his diet is terrible. He states that he does not eat breakfast or lunch. The patient reports that all of his food intake is occurring at 6:00 PM and later. He states that it is within 2 hours of going to bed. The patient reports that he has not tried any acid reflux medication.    Patient presents today for a preventive medical visit.  Patient is here to determine screening labs and tests that are due and to determine immunization  status as well.  Patient will be counseled regarding preventative medicine issues such as regular exercise and healthy diet as well.  The following portions of the patient's history were reviewed and updated as appropriate: allergies, current medications, past social history and problem list    Review of Systems   Constitutional: Negative.  Negative for appetite change and unexpected weight change.   HENT: Positive for congestion, postnasal drip and voice change.    Eyes: Negative.    Respiratory: Positive for cough and wheezing.    Cardiovascular: Negative.  Negative for chest pain.   Gastrointestinal: Negative.  Negative for abdominal distention, abdominal pain, diarrhea and nausea.        Patient experiencing heartburn/acid reflux     Endocrine: Negative.    Genitourinary: Negative.    Musculoskeletal: Negative.    Skin: Negative.    Allergic/Immunologic: Negative.    Neurological: Negative.    Hematological: Negative.    Psychiatric/Behavioral: Negative.    All other systems reviewed and are negative.      Objective     Vitals:    03/30/23 1354   BP: 134/82   Pulse: 87   Temp: 97.2 °F (36.2 °C)   SpO2: 99%       Physical Exam  Vitals and nursing note reviewed.   Constitutional:       General: He is not in acute distress.     Appearance: Normal appearance. He is well-developed and normal weight. He is not ill-appearing, toxic-appearing or diaphoretic.   HENT:      Head: Normocephalic and atraumatic.      Right Ear: External ear normal.      Left Ear: External ear normal.      Mouth/Throat:      Pharynx: Posterior oropharyngeal erythema present.   Eyes:      Conjunctiva/sclera: Conjunctivae normal.      Pupils: Pupils are equal, round, and reactive to light.   Neck:      Thyroid: No thyromegaly.      Vascular: No carotid bruit.   Cardiovascular:      Rate and Rhythm: Normal rate and regular rhythm.      Pulses: Normal pulses.      Heart sounds: Normal heart sounds. No murmur heard.  Pulmonary:      Effort:  Pulmonary effort is normal. No respiratory distress.      Breath sounds: Normal breath sounds.   Abdominal:      General: Bowel sounds are normal.      Palpations: Abdomen is soft. There is no mass.      Tenderness: There is no abdominal tenderness.   Musculoskeletal:         General: No swelling. Normal range of motion.      Cervical back: Normal range of motion and neck supple.   Lymphadenopathy:      Cervical: No cervical adenopathy.   Skin:     General: Skin is warm and dry.      Findings: No lesion or rash.   Neurological:      Mental Status: He is alert and oriented to person, place, and time.      Cranial Nerves: No cranial nerve deficit.      Sensory: No sensory deficit.      Motor: No weakness.      Coordination: Coordination normal.      Gait: Gait normal.      Deep Tendon Reflexes: Reflexes are normal and symmetric.   Psychiatric:         Mood and Affect: Mood normal.         Behavior: Behavior normal.         Thought Content: Thought content normal.         Judgment: Judgment normal.       Discussed preventative medicine issues with patient including regular exercise, healthy diet, stress reduction, adequate sleep and recommended age-appropriate screening studies.  Assessment & Plan     Diagnoses and all orders for this visit:    1. General medical exam (Primary)  -     Comprehensive metabolic panel; Future  -     CBC & Differential; Future  -     Lipid Panel; Future  -     TSH; Future    2. Gastroesophageal reflux disease, unspecified whether esophagitis present  -     Comprehensive metabolic panel; Future  -     CBC & Differential; Future  -     Lipid Panel; Future  -     TSH; Future    3. Chronic hoarseness  -     Ambulatory Referral to ENT (Otolaryngology)  -     Ambulatory Referral to Pulmonology  -     Comprehensive metabolic panel; Future  -     CBC & Differential; Future  -     Lipid Panel; Future  -     TSH; Future    4. Chronic cough  -     Ambulatory Referral to ENT (Otolaryngology)  -      Ambulatory Referral to Pulmonology  -     Comprehensive metabolic panel; Future  -     CBC & Differential; Future  -     Lipid Panel; Future  -     TSH; Future    5. Sore throat, chronic  -     Comprehensive metabolic panel; Future  -     CBC & Differential; Future  -     Lipid Panel; Future  -     TSH; Future    Other orders  -     omeprazole (priLOSEC) 40 MG capsule; Take 1 capsule by mouth Daily. With dinner for GERD  Dispense: 30 capsule; Refill: 1       1. Annual physical exam (Primary)  - The patient is doing well overall. He is up to date on his preventative care. He is up to date on his colonoscopy.    2. Gastroesophageal reflux disease without esophagitis  - The patient will be referred to ENT for further evaluation and treatment and will also be prescribed omeprazole to take with his evening meal.    3. Wheezing  - The patient will be referred to pulmonary for further evaluation and treatment.    Transcribed from ambient dictation for Chio Franco PA-C by Linh Funes.  03/30/23   17:40 EDT    Patient or patient representative verbalized consent to the visit recording.  I have personally performed the services described in this document as transcribed by the above individual, and it is both accurate and complete.  Chio Franco PA-C  3/31/2023  10:13 EDT

## 2023-03-31 ENCOUNTER — LAB (OUTPATIENT)
Dept: LAB | Facility: HOSPITAL | Age: 33
End: 2023-03-31
Payer: COMMERCIAL

## 2023-03-31 DIAGNOSIS — Z00.00 GENERAL MEDICAL EXAM: ICD-10-CM

## 2023-03-31 DIAGNOSIS — K21.9 GASTROESOPHAGEAL REFLUX DISEASE, UNSPECIFIED WHETHER ESOPHAGITIS PRESENT: ICD-10-CM

## 2023-03-31 DIAGNOSIS — R05.3 CHRONIC COUGH: ICD-10-CM

## 2023-03-31 DIAGNOSIS — J31.2 SORE THROAT, CHRONIC: ICD-10-CM

## 2023-03-31 DIAGNOSIS — R49.0 CHRONIC HOARSENESS: ICD-10-CM

## 2023-03-31 LAB
ALBUMIN SERPL-MCNC: 4.4 G/DL (ref 3.5–5.2)
ALBUMIN/GLOB SERPL: 1.8 G/DL
ALP SERPL-CCNC: 70 U/L (ref 39–117)
ALT SERPL W P-5'-P-CCNC: 16 U/L (ref 1–41)
ANION GAP SERPL CALCULATED.3IONS-SCNC: 8 MMOL/L (ref 5–15)
AST SERPL-CCNC: 21 U/L (ref 1–40)
BASOPHILS # BLD AUTO: 0.03 10*3/MM3 (ref 0–0.2)
BASOPHILS NFR BLD AUTO: 0.6 % (ref 0–1.5)
BILIRUB SERPL-MCNC: 0.4 MG/DL (ref 0–1.2)
BUN SERPL-MCNC: 16 MG/DL (ref 6–20)
BUN/CREAT SERPL: 14.3 (ref 7–25)
CALCIUM SPEC-SCNC: 9.5 MG/DL (ref 8.6–10.5)
CHLORIDE SERPL-SCNC: 105 MMOL/L (ref 98–107)
CHOLEST SERPL-MCNC: 181 MG/DL (ref 0–200)
CO2 SERPL-SCNC: 28 MMOL/L (ref 22–29)
CREAT SERPL-MCNC: 1.12 MG/DL (ref 0.76–1.27)
DEPRECATED RDW RBC AUTO: 39.5 FL (ref 37–54)
EGFRCR SERPLBLD CKD-EPI 2021: 89 ML/MIN/1.73
EOSINOPHIL # BLD AUTO: 0.24 10*3/MM3 (ref 0–0.4)
EOSINOPHIL NFR BLD AUTO: 4.8 % (ref 0.3–6.2)
ERYTHROCYTE [DISTWIDTH] IN BLOOD BY AUTOMATED COUNT: 12.2 % (ref 12.3–15.4)
GLOBULIN UR ELPH-MCNC: 2.5 GM/DL
GLUCOSE SERPL-MCNC: 92 MG/DL (ref 65–99)
HCT VFR BLD AUTO: 45.4 % (ref 37.5–51)
HDLC SERPL-MCNC: 50 MG/DL (ref 40–60)
HGB BLD-MCNC: 15.2 G/DL (ref 13–17.7)
IMM GRANULOCYTES # BLD AUTO: 0.02 10*3/MM3 (ref 0–0.05)
IMM GRANULOCYTES NFR BLD AUTO: 0.4 % (ref 0–0.5)
LDLC SERPL CALC-MCNC: 120 MG/DL (ref 0–100)
LDLC/HDLC SERPL: 2.4 {RATIO}
LYMPHOCYTES # BLD AUTO: 1.46 10*3/MM3 (ref 0.7–3.1)
LYMPHOCYTES NFR BLD AUTO: 28.9 % (ref 19.6–45.3)
MCH RBC QN AUTO: 29.5 PG (ref 26.6–33)
MCHC RBC AUTO-ENTMCNC: 33.5 G/DL (ref 31.5–35.7)
MCV RBC AUTO: 88.2 FL (ref 79–97)
MONOCYTES # BLD AUTO: 0.42 10*3/MM3 (ref 0.1–0.9)
MONOCYTES NFR BLD AUTO: 8.3 % (ref 5–12)
NEUTROPHILS NFR BLD AUTO: 2.88 10*3/MM3 (ref 1.7–7)
NEUTROPHILS NFR BLD AUTO: 57 % (ref 42.7–76)
NRBC BLD AUTO-RTO: 0 /100 WBC (ref 0–0.2)
PLATELET # BLD AUTO: 246 10*3/MM3 (ref 140–450)
PMV BLD AUTO: 10.6 FL (ref 6–12)
POTASSIUM SERPL-SCNC: 4.5 MMOL/L (ref 3.5–5.2)
PROT SERPL-MCNC: 6.9 G/DL (ref 6–8.5)
RBC # BLD AUTO: 5.15 10*6/MM3 (ref 4.14–5.8)
SODIUM SERPL-SCNC: 141 MMOL/L (ref 136–145)
TRIGL SERPL-MCNC: 55 MG/DL (ref 0–150)
VLDLC SERPL-MCNC: 11 MG/DL (ref 5–40)
WBC NRBC COR # BLD: 5.05 10*3/MM3 (ref 3.4–10.8)

## 2023-03-31 PROCEDURE — 36415 COLL VENOUS BLD VENIPUNCTURE: CPT

## 2023-03-31 PROCEDURE — 80050 GENERAL HEALTH PANEL: CPT

## 2023-03-31 PROCEDURE — 80061 LIPID PANEL: CPT

## 2023-04-01 LAB — TSH SERPL DL<=0.05 MIU/L-ACNC: 0.97 UIU/ML (ref 0.27–4.2)

## 2023-05-15 ENCOUNTER — OFFICE VISIT (OUTPATIENT)
Dept: PULMONOLOGY | Facility: CLINIC | Age: 33
End: 2023-05-15
Payer: COMMERCIAL

## 2023-05-15 VITALS
WEIGHT: 201.4 LBS | HEIGHT: 72 IN | HEART RATE: 84 BPM | OXYGEN SATURATION: 97 % | BODY MASS INDEX: 27.28 KG/M2 | SYSTOLIC BLOOD PRESSURE: 122 MMHG | TEMPERATURE: 97.6 F | DIASTOLIC BLOOD PRESSURE: 80 MMHG

## 2023-05-15 DIAGNOSIS — R49.0 HOARSENESS: ICD-10-CM

## 2023-05-15 DIAGNOSIS — R05.9 COUGH, UNSPECIFIED TYPE: Primary | ICD-10-CM

## 2023-05-15 DIAGNOSIS — Z72.0 TOBACCO ABUSE: ICD-10-CM

## 2023-05-15 RX ORDER — OMEPRAZOLE 40 MG/1
40 CAPSULE, DELAYED RELEASE ORAL 2 TIMES DAILY
Qty: 60 CAPSULE | Refills: 1 | Status: SHIPPED | OUTPATIENT
Start: 2023-05-15

## 2023-05-15 NOTE — PROGRESS NOTES
New Patient Pulmonary Office Visit      Patient Name: Chaparro Villegas    Referring Physician: Chio Franco PA-C    Chief Complaint:    Chief Complaint   Patient presents with   • Wheezing   • Cough   • Shortness of Breath       History of Present Illness: Chaparro Villegas is a 32 y.o. male who is here today to establish care with Pulmonary.  Patient is a past medical history significant for cluster headaches and hypertension.  Who was referred to pulmonary for evaluation of a chronic cough and hoarseness.  Patient notes that he had a chronic throat clearing for quite a few years now.  He used to smoke and then eventually vape.  He has done marijuana at times in the past as well.  He is try to cut back on all smoking at this point in time.  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  Particularly he is not have as much base anymore.  Denies any chest pain, shortness of breath, nausea or vomiting he does have a wheeze and was able to show it to me when I was in the room.  Where he had forced expiration and had a stridorous wheeze.  He does have fairly significant reflux symptoms has been on omeprazole recently addition of that has tried multiple allergy medications none of which has made much of a difference.  He did see his ENT physician who did a laryngoscopy that was unremarkable, and is also seeing an allergist with plans for allergy testing here in the near future.    Review of Systems:   Review of Systems   Constitutional: Negative for activity change, appetite change, chills and diaphoresis.   HENT: Positive for voice change. Negative for congestion, postnasal drip and sinus pressure.    Eyes: Negative for blurred vision.   Respiratory: Positive for cough. Negative for shortness of breath and wheezing.    Cardiovascular: Negative for chest pain.   Gastrointestinal: Negative for abdominal pain.   Musculoskeletal: Negative for myalgias.   Skin: Negative for color change and  dry skin.   Allergic/Immunologic: Negative for environmental allergies.   Neurological: Negative for weakness and confusion.   Hematological: Negative for adenopathy.   Psychiatric/Behavioral: Negative for sleep disturbance and depressed mood.       Past Medical History:   Past Medical History:   Diagnosis Date   • Allergic 2004   • Cluster headache [id number]    Regular occurence (at least weekly) roughly [id number]; cause never determined   • Depression 2022   • Headache    • Headache, tension-type [id number]    Semi-regular occurence through adulthood   • Hypertension [id number]    Doctors have report slight elevation from normal levels   • Kidney stone 2022       Past Surgical History: History reviewed. No pertinent surgical history.    Family History:   Family History   Problem Relation Age of Onset   • Arthritis Mother    • Colon polyps Mother    • Migraines Mother    • Anxiety disorder Mother    • Colon polyps Father    • Hypertension Father    • Parkinsonism Father    • Learning disabilities Brother    • Mental illness Brother    • Stroke Maternal Aunt    • Mental illness Paternal Uncle    • Arthritis Maternal Grandmother    • Migraines Maternal Grandmother    • Osteoporosis Maternal Grandmother    • Breast cancer Paternal Grandmother    • Diabetes Paternal Grandmother    • Mental illness Paternal Grandmother    • Depression Paternal Grandmother    • Stroke Paternal Grandfather    • Heart attack Paternal Grandfather        Social History:   Social History     Socioeconomic History   • Marital status: Single   Tobacco Use   • Smoking status: Former     Packs/day: 1.00     Years: 13.00     Pack years: 13.00     Types: Electronic Cigarette, Cigarettes   • Smokeless tobacco: Never   • Tobacco comments:     Off and on for 15 years between cigarettes, e-cigarettes and oral delivery.    Vaping Use   • Vaping Use: Former   • Substances: Nicotine   • Devices: Disposable   Substance and Sexual Activity   • Alcohol use: No  "  • Drug use: Not Currently     Types: Marijuana   • Sexual activity: Yes     Partners: Female     Birth control/protection: None     Comment: Monogamous - 10 years       Medications:     Current Outpatient Medications:   •  buPROPion (WELLBUTRIN) 75 MG tablet, Take 1 tablet by mouth Daily. Take 1 tablet by mouth Daily. Molly Browne, Behavioral health, Disp: , Rfl:   •  buPROPion XL (WELLBUTRIN XL) 150 MG 24 hr tablet, Take 1 tablet by mouth Every Morning. Take 150 mg by mouth Every Morning., Disp: , Rfl:   •  coenzyme Q10 100 MG capsule, Take 1 capsule by mouth Daily., Disp: , Rfl:   •  ibuprofen (ADVIL,MOTRIN) 600 MG tablet, Take 1 tablet by mouth Every 6 (Six) Hours As Needed for Mild Pain., Disp: , Rfl:   •  Magnesium 100 MG capsule, Take  by mouth., Disp: , Rfl:   •  multivitamin with minerals tablet tablet, Take 1 tablet by mouth Daily., Disp: , Rfl:   •  Omega 3 1200 MG capsule, Take  by mouth., Disp: , Rfl:   •  omeprazole (priLOSEC) 40 MG capsule, Take 1 capsule by mouth 2 (Two) Times a Day. With dinner for GERD, Disp: 60 capsule, Rfl: 1  •  Vitamin B-2 (RIBOFLAVIN) 100 MG tablet tablet, Take 1 tablet by mouth Daily., Disp: , Rfl:     Allergies:   Allergies   Allergen Reactions   • Penicillins Anaphylaxis       Physical Exam:  Vital Signs:   Vitals:    05/15/23 1420   BP: 122/80   Pulse: 84   Temp: 97.6 °F (36.4 °C)   SpO2: 97%  Comment: resting, room air   Weight: 91.4 kg (201 lb 6.4 oz)   Height: 182.9 cm (72\")       Physical Exam  Vitals and nursing note reviewed.   Constitutional:       General: He is not in acute distress.     Appearance: He is well-developed and normal weight. He is not ill-appearing or toxic-appearing.   Cardiovascular:      Rate and Rhythm: Normal rate and regular rhythm.      Pulses: Normal pulses.      Heart sounds: Normal heart sounds. No murmur heard.    No gallop.   Pulmonary:      Effort: Pulmonary effort is normal.      Breath sounds: Normal breath sounds. No wheezing, " rhonchi or rales.   Musculoskeletal:      Right lower leg: No edema.      Left lower leg: No edema.   Neurological:      Mental Status: He is alert.         Immunization History   Administered Date(s) Administered   • COVID-19 (MODERNA) 1st,2nd,3rd Dose Monovalent 03/18/2021, 04/15/2021, 12/17/2021   • FluLaval/Fluzone >6mos 11/11/2022   • Influenza Seasonal Injectable 10/13/2021   • Influenza, Unspecified 10/13/2021, 11/30/2022   • Tdap 01/05/2021       Results Review:   - I personally reviewed the pts imaging from chest x-ray from 5/15/2023 shows no acute cardiopulmonary process.  - I personally reviewed the pts PFT from 5/15/2023 shows no obstruction or restriction with a normal DLCO    Assessment / Plan:   Diagnoses and all orders for this visit:    1. Cough, unspecified type (Primary)  -With regards to the patient's cough we discussed the 3 most common causes being asthma, allergy, and reflux.  I explained the patient my theory on treatment for this which is maximizing therapy for 1 process at a time and trying to get the cough to go away before proceeding with further testing.  He verbalized understanding of this.  He is already undergoing allergy testing is seeing ENT.  Due to this we are holding on the postnasal drip regimen for now and will start with reflux as he does have some symptoms.  -I will start the patient on omeprazole 40 mg twice daily for the next 4 weeks in addition to that we discussed elevating the head of the bed 2 to 6 inches and not eating and drinking 2 hours prior to going to sleep.  At the end of 4 weeks he is to stop all measures to see if his cough comes back or worsens.  If there is no improvement at the next visit we will proceed with the postnasal drip regimen.    2. Hoarseness  -No obvious cause for the patient's symptoms from pulmonary standpoint.  The stridorous noise the patient made in the office today is much more consistent with a an upper airway issue such as paradoxical  vocal cords, but ultimately we can work on drainage and treatment for reflux which may help the symptoms overall.    3.  Tobacco abuse  -Highly recommend tobacco cessation he is working on quitting at this time try to cut back.    Follow Up:   Return in about 6 weeks (around 6/26/2023).     THADDEUS Bey, DO  Pulmonary and Critical Care Medicine  Note Electronically Signed    Part of this note may be an electronic transcription/translation of spoken language to printed text using the Dragon Dictation System.

## 2023-06-14 DIAGNOSIS — R05.9 COUGH, UNSPECIFIED TYPE: ICD-10-CM

## 2023-06-14 RX ORDER — OMEPRAZOLE 40 MG/1
40 CAPSULE, DELAYED RELEASE ORAL 2 TIMES DAILY
Qty: 60 CAPSULE | Refills: 1 | Status: SHIPPED | OUTPATIENT
Start: 2023-06-14